# Patient Record
Sex: FEMALE | Race: BLACK OR AFRICAN AMERICAN | Employment: FULL TIME | URBAN - METROPOLITAN AREA
[De-identification: names, ages, dates, MRNs, and addresses within clinical notes are randomized per-mention and may not be internally consistent; named-entity substitution may affect disease eponyms.]

---

## 2017-04-19 ENCOUNTER — OFFICE VISIT (OUTPATIENT)
Dept: INTERNAL MEDICINE CLINIC | Age: 22
End: 2017-04-19

## 2017-04-19 VITALS
TEMPERATURE: 98.3 F | WEIGHT: 235.2 LBS | DIASTOLIC BLOOD PRESSURE: 70 MMHG | SYSTOLIC BLOOD PRESSURE: 119 MMHG | HEIGHT: 66 IN | BODY MASS INDEX: 37.8 KG/M2 | OXYGEN SATURATION: 98 % | RESPIRATION RATE: 16 BRPM | HEART RATE: 82 BPM

## 2017-04-19 DIAGNOSIS — H65.192 OTHER ACUTE NONSUPPURATIVE OTITIS MEDIA OF LEFT EAR, RECURRENCE NOT SPECIFIED: Primary | ICD-10-CM

## 2017-04-19 RX ORDER — AMOXICILLIN 875 MG/1
875 TABLET, FILM COATED ORAL 2 TIMES DAILY
Qty: 20 TAB | Refills: 0 | Status: SHIPPED | OUTPATIENT
Start: 2017-04-19 | End: 2017-04-29

## 2017-04-19 NOTE — PROGRESS NOTES
Subjective:     Chief Complaint   Patient presents with    Ear Pain     x 1 month left ear    Headache        She  is a 19y.o. year old female who presents with a complain of dull left ear pain for the past one month associated with left sided headache. Reports that she has been using OTC some drops to relive the symptoms but no relief. Pertinent items are noted in HPI. Objective:     Vitals:    17 1553   BP: 119/70   Pulse: 82   Resp: 16   Temp: 98.3 °F (36.8 °C)   TempSrc: Oral   SpO2: 98%   Weight: 235 lb 3.2 oz (106.7 kg)   Height: 5' 6\" (1.676 m)       Physical Examination: General appearance - alert, well appearing, and in no distress, oriented to person, place, and time and overweight  Mental status - alert, oriented to person, place, and time, normal mood, behavior, speech, dress, motor activity, and thought processes  Ears - right TM red but not bulging. left TM red, dull, bulging  Mouth - mucous membranes moist, pharynx normal without lesions. TTP over mastoid process.    Neck - supple, no significant adenopathy    No Known Allergies   Social History     Social History    Marital status: SINGLE     Spouse name: N/A    Number of children: N/A    Years of education: N/A     Social History Main Topics    Smoking status: Current Every Day Smoker     Packs/day: 0.25    Smokeless tobacco: Never Used    Alcohol use No      Comment: rarely    Drug use: No    Sexual activity: Yes     Partners: Male     Birth control/ protection: IUD     Other Topics Concern    None     Social History Narrative      Family History   Problem Relation Age of Onset    Stroke Maternal Grandfather     Hypertension Maternal Grandfather     Heart Attack Maternal Uncle       Past Surgical History:   Procedure Laterality Date    HX HEENT      [place] removed in     HX OTHER SURGICAL  D&C      x3      Past Medical History:   Diagnosis Date    Chlamydia     hx of a few years ago and treated    Headache  Heart abnormalities murmur    Heart murmur     Musculoskeletal disorder     Stool color black     Vaginal delivery       Current Outpatient Prescriptions   Medication Sig Dispense Refill    amoxicillin (AMOXIL) 875 mg tablet Take 1 Tab by mouth two (2) times a day for 10 days. 20 Tab 0    levonorgestrel (MIRENA) 20 mcg/24 hr (5 years) IUD 1 Each by IntraUTERine route once.  ibuprofen (MOTRIN) 800 mg tablet Take 1 Tab by mouth every eight (8) hours as needed for Pain. Indications: PAIN 30 Tab 0    calcium carbonate (TUMS) 200 mg calcium (500 mg) chew Take 1 Tab by mouth daily. Assessment/ Plan:   Aminata Hardy was seen today for ear pain and headache. Diagnoses and all orders for this visit:    Other acute nonsuppurative otitis media of left ear, recurrence not specified  -     Start amoxicillin (AMOXIL) 875 mg tablet; Take 1 Tab by mouth two (2) times a day for 10 days. -     Take Advil/aleve for the pain. Medication risks/benefits/costs/interactions/alternatives discussed with patient. Advised patient to call back or return to office if symptoms worsen/change/persist. If patient cannot reach us or should anything more severe/urgent arise he/she should proceed directly to the nearest emergency department. Discussed expected course/resolution/complications of diagnosis in detail with patient. Patient given a written after visit summary which includes her diagnoses, current medications and vitals. Patient expressed understanding with the diagnosis and plan. Follow-up Disposition:  Return if symptoms worsen or fail to improve.

## 2017-04-19 NOTE — MR AVS SNAPSHOT
Visit Information Date & Time Provider Department Dept. Phone Encounter #  
 4/19/2017  4:00 PM Philip Aldana MD [facility] [phone] [id number] Follow-up Instructions Return if symptoms worsen or fail to improve. Upcoming Health Maintenance Date Due  
 HPV AGE 9Y-34Y (1 of 3 - Female 3 Dose Series) 9/14/2004 Pneumococcal 19-64 Medium Risk (1 of 1 - PPSV23) 9/14/2012 DTaP/Tdap/Td series (1 - Tdap) 9/14/2014 PAP AKA CERVICAL CYTOLOGY 9/14/2014 INFLUENZA AGE 9 TO ADULT 8/1/2016 Allergies as of 4/19/2017  Review Complete On: 4/19/2017 By: Grant Palomo MD  
 No Known Allergies Current Immunizations  Never Reviewed No immunizations on file. Not reviewed this visit You Were Diagnosed With   
  
 Codes Comments Other acute nonsuppurative otitis media of left ear, recurrence not specified    -  Primary ICD-10-CM: E22.493 ICD-9-CM: 381.00 Vitals BP Pulse Temp Resp Height(growth percentile) Weight(growth percentile) 119/70 (BP 1 Location: Left arm, BP Patient Position: Sitting) 82 98.3 °F (36.8 °C) (Oral) 16 5' 6\" (1.676 m) 235 lb 3.2 oz (106.7 kg) LMP SpO2 BMI OB Status Smoking Status 03/16/2017 98% 37.96 kg/m2 Having regular periods Current Every Day Smoker BMI and BSA Data Body Mass Index Body Surface Area  
 37.96 kg/m 2 2.23 m 2 Preferred Pharmacy Pharmacy Name Phone [address] [phone] Your Updated Medication List  
  
   
This list is accurate as of: 4/19/17  4:14 PM.  Always use your most recent med list.  
  
  
  
  
 amoxicillin 875 mg tablet Commonly known as:  AMOXIL Take 1 Tab by mouth two (2) times a day for 10 days. calcium carbonate 200 mg calcium (500 mg) Chew Commonly known as:  TUMS Take 1 Tab by mouth daily. ibuprofen 800 mg tablet Commonly known as:  MOTRIN Take 1 Tab by mouth every eight (8) hours as needed for Pain. Indications: PAIN  
  
 levonorgestrel 20 mcg/24 hr (5 years) IUD Commonly known as:  MIRENA  
1 Each by IntraUTERine route once. Prescriptions Sent to Pharmacy Refills  
 amoxicillin (AMOXIL) 875 mg tablet 0 Sig: Take 1 Tab by mouth two (2) times a day for 10 days. Class: Normal  
 Pharmacy: [facility] [address] [place] [address] Drive [address] Ph #: [phone] Route: Oral  
  
Follow-up Instructions Return if symptoms worsen or fail to improve. Introducing [facility]! Hero Kimmy introduces "[employer] patient portal. Now you can access parts of your medical record, email your doctor's office, and request medication refills online. 1. In your internet browser, go to [email or web address] 2. Click on the First Time User? Click Here link in the Sign In box. You will see the New Member Sign Up page. 3. Enter your "[employer] Access Code exactly as it appears below. You will not need to use this code after youve completed the sign-up process. If you do not sign up before the expiration date, you must request a new code. · "[employer] Access Code: [email or web address] Expires: 7/18/2017  4:14 PM 
 
4. Enter the last four digits of your Social Security Number (xxxx) and Date of Birth (mm/dd/yyyy) as indicated and click Submit. You will be taken to the next sign-up page. 5. Create a [employer] ID. This will be your "[employer] login ID and cannot be changed, so think of one that is secure and easy to remember. 6. Create a "[employer] password. You can change your password at any time. 7. Enter your Password Reset Question and Answer. This can be used at a later time if you forget your password. 8. Enter your e-mail address. You will receive e-mail notification when new information is available in [address] 9. Click Sign Up. You can now view and download portions of your medical record. 10. Click the Download Summary menu link to download a portable copy of your medical information. If you have questions, please visit the Frequently Asked Questions section of the [employer] website. Remember, [employer] is NOT to be used for urgent needs. For medical emergencies, dial 911. Now available from your iPhone and Android! Please provide this summary of care documentation to your next provider. Your primary care clinician is listed as Philip Serrano. If you have any questions after today's visit, please call [phone].

## 2017-09-01 ENCOUNTER — HOSPITAL ENCOUNTER (EMERGENCY)
Age: 22
Discharge: HOME | End: 2017-09-01
Attending: STUDENT IN AN ORGANIZED HEALTH CARE EDUCATION/TRAINING PROGRAM
Payer: SELF-PAY

## 2017-09-01 VITALS
HEIGHT: 65 IN | BODY MASS INDEX: 38.52 KG/M2 | SYSTOLIC BLOOD PRESSURE: 137 MMHG | DIASTOLIC BLOOD PRESSURE: 90 MMHG | TEMPERATURE: 98.7 F | HEART RATE: 80 BPM | OXYGEN SATURATION: 100 % | RESPIRATION RATE: 16 BRPM | WEIGHT: 231.2 LBS

## 2017-09-01 DIAGNOSIS — T85.848A DENTAL IMPLANT PAIN, INITIAL ENCOUNTER: Primary | ICD-10-CM

## 2017-09-01 PROCEDURE — 99283 EMERGENCY DEPT VISIT LOW MDM: CPT

## 2017-09-01 PROCEDURE — 74011250637 HC RX REV CODE- 250/637: Performed by: STUDENT IN AN ORGANIZED HEALTH CARE EDUCATION/TRAINING PROGRAM

## 2017-09-01 PROCEDURE — 74011000250 HC RX REV CODE- 250: Performed by: STUDENT IN AN ORGANIZED HEALTH CARE EDUCATION/TRAINING PROGRAM

## 2017-09-01 RX ORDER — OXYCODONE AND ACETAMINOPHEN 5; 325 MG/1; MG/1
1 TABLET ORAL
Qty: 10 TAB | Refills: 0 | Status: SHIPPED | OUTPATIENT
Start: 2017-09-01 | End: 2017-09-04

## 2017-09-01 RX ADMIN — LIDOCAINE HYDROCHLORIDE: 20 SOLUTION ORAL; TOPICAL at 06:50

## 2017-09-01 NOTE — DISCHARGE INSTRUCTIONS
We hope that we have addressed all of your medical concerns. The examination and treatment you received in the Emergency Department were for an emergent problem and were not intended as complete care. It is important that you follow up with your healthcare provider(s) for ongoing care. If your symptoms worsen or do not improve as expected, and you are unable to reach your usual health care provider(s), you should return to the Emergency Department. Today's healthcare is undergoing tremendous change, and patient satisfaction surveys are one of the many tools to assess the quality of medical care. You may receive a survey from the [employer]. regarding your experience in the Emergency Department. I hope that your experience has been completely positive, particularly the medical care that I provided. As such, please participate in the survey; anything less than excellent does not meet my expectations or intentions. [facility] and [address] [place] [address] participate in nationally recognized quality of care measures. If your blood pressure is greater than 120/80, as reported below, we urge that you seek medical care to address the potential of high blood pressure, commonly known as hypertension. Hypertension can be hereditary or can be caused by certain medical conditions, pain, stress, or \"white coat syndrome. \"       Please make an appointment with your health care provider(s) for follow up of your Emergency Department visit. VITALS:   Patient Vitals for the past 8 hrs:   Temp Pulse Resp BP SpO2   09/01/17 0631 98.7 °F (37.1 °C) 80 16 137/90 100 %          Thank you for allowing us to provide you with medical care today. We realize that you have many choices for your emergency care needs. Please choose us in the future for any continued health care needs. Heyward Goltz Robbert Laurence, [facility]. Office: [phone]            No results found for this or any previous visit (from the past 24 hour(s)). No results found. Dental Pain: After Your Visit  Your Care Instructions  The most common cause of dental pain is tooth decay. It can also be caused by an infection of the tooth (abscess) or gum, a tooth that has not broken all the way through the gum (impacted tooth), or a problem with the nerve-filled center of the tooth. Follow-up care is a key part of your treatment and safety. Be sure to make and go to all appointments, and call your doctor if you are having problems. Its also a good idea to know your test results and keep a list of the medicines you take. How can you care for yourself at home? · Contact a dentist for follow-up care. · Put ice or a cold pack on the outside of your mouth for 10 to 20 minutes at a time to reduce pain and swelling. Put a thin cloth between the ice and your skin. · Take an over-the-counter pain medicine, such as acetaminophen (Tylenol), ibuprofen (Advil, Motrin), or naproxen (Aleve). Read and follow all instructions on the label. · Do not take two or more pain medicines at the same time unless the doctor told you to. Many pain medicines have acetaminophen, which is Tylenol. Too much acetaminophen (Tylenol) can be harmful. · Rinse your mouth with warm salt water every 2 hours to help relieve pain and swelling from an infected tooth. Mix 1 teaspoon of salt in 8 ounces of water. · If your doctor prescribed antibiotics, take them as directed. Do not stop taking them just because you feel better. You need to take the full course of antibiotics. When should you call for help? Call your doctor now or seek immediate medical care if:  · You have signs of infection, such as:  ¨ Increased pain, swelling, warmth, or redness. ¨ Pus draining from the gum, tooth, or face. ¨ A fever.   Watch closely for changes in your health, and be sure to contact your doctor if:  · You do not get better as expected. Where can you learn more? Go to WebKite.be  Enter V264 in the search box to learn more about \"Dental Pain: After Your Visit. \"   © 7937-2199 [employer]. Care instructions adapted under license by Cheryl Wagner (which disclaims liability or warranty for this information). This care instruction is for use with your licensed healthcare professional. If you have questions about a medical condition or this instruction, always ask your healthcare professional. [place] [address] any warranty or liability for your use of this information. Content Version: 83.0.066618;  Last Revised: May 17, 2013

## 2017-09-01 NOTE — ED PROVIDER NOTES
HPI Comments: 19 y.o. female with past medical history significant for heart abnormalities who presents from home with chief complaint of dental pain. The pt c/o pain of her 31st tooth. The pt reports that she saw her dentist 2 months ago for filling reconstruction which is now causing her discomfort. The pain is worsened with suction and PO intake. The pt has been taking Tylenol, Ibuprofen, and BC powder without relief. The drive to the ED. The pt denies having allergies. There are no other acute medical concerns at this time. Social hx: smoker, no EtOH use, no drug use  PCP: Fabienne Alcantar MD    Note written by Janak Lopez, as dictated by Prudencio Conteh MD 6:45 AM      The history is provided by the patient. No  was used. Past Medical History:   Diagnosis Date    Chlamydia     hx of a few years ago and treated    Headache     Heart abnormalities murmur    Heart murmur     Musculoskeletal disorder     Stool color black     Vaginal delivery        Past Surgical History:   Procedure Laterality Date    HX HEENT      [place] removed in     HX OTHER SURGICAL  D&C      x3         Family History:   Problem Relation Age of Onset    Stroke Maternal Grandfather     Hypertension Maternal Grandfather     Heart Attack Maternal Uncle        Social History     Social History    Marital status: SINGLE     Spouse name: N/A    Number of children: N/A    Years of education: N/A     Occupational History    Not on file. Social History Main Topics    Smoking status: Current Every Day Smoker     Packs/day: 0.25    Smokeless tobacco: Never Used    Alcohol use No      Comment: rarely    Drug use: No    Sexual activity: Yes     Partners: Male     Birth control/ protection: IUD     Other Topics Concern    Not on file     Social History Narrative         ALLERGIES: Review of patient's allergies indicates no known allergies.     Review of Systems Constitutional: Negative for activity change, diaphoresis, fatigue and fever. HENT: Positive for dental problem. Negative for congestion and sore throat. Eyes: Negative for photophobia and visual disturbance. Respiratory: Negative for chest tightness and shortness of breath. Cardiovascular: Negative for chest pain, palpitations and leg swelling. Gastrointestinal: Negative for abdominal pain, blood in stool, constipation, diarrhea, nausea and vomiting. Genitourinary: Negative for difficulty urinating, dysuria, flank pain, frequency and hematuria. Musculoskeletal: Negative for back pain. Neurological: Negative for dizziness, syncope, numbness and headaches. All other systems reviewed and are negative. Vitals:    09/01/17 0631   BP: 137/90   Pulse: 80   Resp: 16   Temp: 98.7 °F (37.1 °C)   SpO2: 100%   Weight: 104.9 kg (231 lb 3.2 oz)   Height: 5' 5\" (1.651 m)            Physical Exam   Constitutional: She is oriented to person, place, and time. She appears well-developed and well-nourished. No distress. HENT:   Head: Normocephalic and atraumatic. Nose: Nose normal.   Mouth/Throat: Oropharynx is clear and moist. No oropharyngeal exudate. Eyes: Conjunctivae and EOM are normal. Right eye exhibits no discharge. Left eye exhibits no discharge. No scleral icterus. Neck: Normal range of motion. Neck supple. No JVD present. No tracheal deviation present. No thyromegaly present. Cardiovascular: Normal rate, regular rhythm, normal heart sounds and intact distal pulses. Exam reveals no gallop and no friction rub. No murmur heard. Pulmonary/Chest: Effort normal and breath sounds normal. No stridor. No respiratory distress. She has no wheezes. She has no rales. She exhibits no tenderness. Abdominal: Bowel sounds are normal. She exhibits no distension and no mass. There is no tenderness. There is no rebound. Musculoskeletal: Normal range of motion. She exhibits no edema or tenderness. Lymphadenopathy:     She has no cervical adenopathy. Neurological: She is alert and oriented to person, place, and time. No cranial nerve deficit. Coordination normal.   Skin: Skin is warm and dry. No rash noted. She is not diaphoretic. No erythema. No pallor. Psychiatric: She has a normal mood and affect. Her behavior is normal. Judgment and thought content normal.   Nursing note and vitals reviewed. Note written by Janak Velazquez, as dictated by Corrina Jay MD 6:45 AM      MDM  Number of Diagnoses or Management Options  Dental implant pain, initial encounter:   Diagnosis management comments:   Dental pain, dental caries, dental filling pain. This is 17-year-old female presenting with isolated tooth pain physical exam reassuring no evidence of abscess or infection likely pain secondary to feeling recently a dentist.    Plan: We'll provide local anesthesia to 2 advised patient to follow up with dentist for further management patient understands and agrees with plan. Risk of Complications, Morbidity, and/or Mortality  Presenting problems: low  Diagnostic procedures: low  Management options: low    Patient Progress  Patient progress: stable    ED Course       Procedures    7:28 AM  The patient has been reevaluated. The patient is ready for discharge. The patient's signs, symptoms, diagnosis, and discharge instructions have been discussed and the patient/ family has conveyed their understanding. The patient is to follow up as recommended or return to the ED should their symptoms worsen. Plan has been discussed and the patient is in agreement. LABORATORY TESTS:  No results found for this or any previous visit (from the past 12 hour(s)). IMAGING RESULTS:  No orders to display     No results found. MEDICATIONS GIVEN:  Medications   dental ball (lidocaine/benadryl/benzocaine) mixture ( Other Given 9/1/17 1213)       IMPRESSION:  1.  Dental implant pain, initial encounter PLAN:  1. Discharge Medication List as of 9/1/2017  7:02 AM      CONTINUE these medications which have NOT CHANGED    Details   levonorgestrel (MIRENA) 20 mcg/24 hr (5 years) IUD 1 Each by IntraUTERine route once., Historical Med      ibuprofen (MOTRIN) 800 mg tablet Take 1 Tab by mouth every eight (8) hours as needed for Pain. Indications: PAIN, Print, Disp-30 Tab, R-0      calcium carbonate (TUMS) 200 mg calcium (500 mg) chew Take 1 Tab by mouth daily. , Historical Med           2.    Follow-up Information     Follow up With Details Comments Josh Moffett MD  If symptoms worsen [address] [address] [phone]      [facility] EMERGENCY DEP  If symptoms worsen [address] [facility]  [phone]            Return to ED for new or worsening symptoms       Sumit Cain MD

## 2017-09-01 NOTE — ED TRIAGE NOTES
Patient arrives to ED with c/o pain to right bottom tooth, x 1 week, no swelling noted, patient had an appointment to see her dentist next tue, arrives to this ED with increased pain

## 2018-03-22 ENCOUNTER — OFFICE VISIT (OUTPATIENT)
Dept: INTERNAL MEDICINE CLINIC | Age: 23
End: 2018-03-22

## 2018-03-22 VITALS
DIASTOLIC BLOOD PRESSURE: 60 MMHG | HEART RATE: 66 BPM | HEIGHT: 65 IN | BODY MASS INDEX: 41.95 KG/M2 | RESPIRATION RATE: 18 BRPM | SYSTOLIC BLOOD PRESSURE: 102 MMHG | TEMPERATURE: 98.3 F | WEIGHT: 251.8 LBS | OXYGEN SATURATION: 99 %

## 2018-03-22 DIAGNOSIS — E66.01 OBESITY, MORBID (HCC): ICD-10-CM

## 2018-03-22 DIAGNOSIS — Z00.00 WELL ADULT ON ROUTINE HEALTH CHECK: Primary | ICD-10-CM

## 2018-03-22 RX ORDER — MEDROXYPROGESTERONE ACETATE 10 MG/1
TABLET ORAL
Refills: 0 | COMMUNITY
Start: 2018-01-30 | End: 2018-06-22 | Stop reason: ALTCHOICE

## 2018-03-22 NOTE — LETTER
3/27/2018 11:37 AM 
 
Ms. Yoel Matute [address] [id number] Dear Yoel Castellano: 
 
Please find your most recent results below. Resulted Orders CBC WITH AUTOMATED DIFF Result Value Ref Range WBC 6.4 3.4 - 10.8 x10E3/uL  
 RBC 4.89 3.77 - 5.28 x10E6/uL HGB 13.0 11.1 - 15.9 g/dL HCT 40.8 34.0 - 46.6 % MCV 83 79 - 97 fL  
 MCH 26.6 26.6 - 33.0 pg  
 MCHC 31.9 31.5 - 35.7 g/dL  
 RDW 14.1 12.3 - 15.4 % PLATELET 390 038 - 091 x10E3/uL NEUTROPHILS 55 Not Estab. % Lymphocytes 33 Not Estab. % MONOCYTES 9 Not Estab. % EOSINOPHILS 3 Not Estab. % BASOPHILS 0 Not Estab. %  
 ABS. NEUTROPHILS 3.5 1.4 - 7.0 x10E3/uL Abs Lymphocytes 2.1 0.7 - 3.1 x10E3/uL  
 ABS. MONOCYTES 0.6 0.1 - 0.9 x10E3/uL  
 ABS. EOSINOPHILS 0.2 0.0 - 0.4 x10E3/uL  
 ABS. BASOPHILS 0.0 0.0 - 0.2 x10E3/uL IMMATURE GRANULOCYTES 0 Not Estab. %  
 ABS. IMM. GRANS. 0.0 0.0 - 0.1 x10E3/uL Narrative Performed at:  [address]  [id number] : Al Vazquez MD, Phone:  [phone] METABOLIC PANEL, COMPREHENSIVE Result Value Ref Range Glucose 105 (H) 65 - 99 mg/dL BUN 10 6 - 20 mg/dL Creatinine 0.66 0.57 - 1.00 mg/dL GFR est non- >59 mL/min/1.73 GFR est  >59 mL/min/1.73  
 BUN/Creatinine ratio 15 9 - 23 Sodium 138 134 - 144 mmol/L Potassium 4.7 3.5 - 5.2 mmol/L Chloride 100 96 - 106 mmol/L  
 CO2 24 18 - 29 mmol/L Calcium 9.6 8.7 - 10.2 mg/dL Protein, total 7.2 6.0 - 8.5 g/dL Albumin 4.4 3.5 - 5.5 g/dL GLOBULIN, TOTAL 2.8 1.5 - 4.5 g/dL A-G Ratio 1.6 1.2 - 2.2 Bilirubin, total 0.3 0.0 - 1.2 mg/dL Alk. phosphatase 63 39 - 117 IU/L  
 AST (SGOT) 19 0 - 40 IU/L  
 ALT (SGPT) 22 0 - 32 IU/L Narrative Performed at:  [address]  [id number] : Al Vazquez MD, Phone:  [phone] TSH AND FREE T4  
 Result Value Ref Range TSH 1.470 0.450 - 4.500 uIU/mL T4, Free 1.25 0.82 - 1.77 ng/dL Narrative Performed at:  [address]  [id number] : Fadi Burgos MD, Phone:  [phone] LIPID PANEL Result Value Ref Range Cholesterol, total 203 (H) 100 - 199 mg/dL Triglyceride 149 0 - 149 mg/dL HDL Cholesterol 72 >39 mg/dL VLDL, calculated 30 5 - 40 mg/dL LDL, calculated 101 (H) 0 - 99 mg/dL Narrative Performed at:  [address]  [id number] : Fadi Burgos MD, Phone:  [phone] HEMOGLOBIN A1C WITH EAG Result Value Ref Range Hemoglobin A1c 5.5 4.8 - 5.6 % Comment:  
            Pre-diabetes: 5.7 - 6.4 Diabetes: >6.4 Glycemic control for adults with diabetes: <7.0 Estimated average glucose 111 mg/dL Narrative Performed at:  [address]  [id number] : Fadi Burgos MD, Phone:  [phone] CVD REPORT Result Value Ref Range INTERPRETATION Note Comment:  
   Supplemental report is available. Narrative Performed at:  [address] [place] [address]  [id number] : Beatrice Hernandez PhD, Phone:  [phone] RECOMMENDATIONS: 
 
1. CBC, kidney, liver, thyroid level is within normal range. 2.  Cholesterol level is very mildly elevated. No need to start any medication. Continue watching your diet, eat healthy, less moreno and fatty food, more baked or grilled or broiled food. Exercise 150 minutes/week will be helpful as well. Will recheck level in one year. Please call me if you have any questions: [phone] Sincerely, 
 
 
Yair Hernandez MD

## 2018-03-22 NOTE — LETTER
NOTIFICATION RETURN TO WORK / SCHOOL 
 
3/22/2018 3:45 PM 
 
Ms. Erendira Mckeon [facility] [address] [address] To Whom It May Concern: 
 
Erendira Mckeon is currently under the care of [facility]. She will return to work/school on: 3/23/18 If there are questions or concerns please have the patient contact our office.  
 
 
 
Sincerely, 
 
 
Leelee Taylor MD

## 2018-03-22 NOTE — PATIENT INSTRUCTIONS
Well Visit, Ages 25 to 48: Care Instructions  Your Care Instructions    Physical exams can help you stay healthy. Your doctor has checked your overall health and may have suggested ways to take good care of yourself. He or she also may have recommended tests. At home, you can help prevent illness with healthy eating, regular exercise, and other steps. Follow-up care is a key part of your treatment and safety. Be sure to make and go to all appointments, and call your doctor if you are having problems. It's also a good idea to know your test results and keep a list of the medicines you take. How can you care for yourself at home? · Reach and stay at a healthy weight. This will lower your risk for many problems, such as obesity, diabetes, heart disease, and high blood pressure. · Get at least 30 minutes of physical activity on most days of the week. Walking is a good choice. You also may want to do other activities, such as running, swimming, cycling, or playing tennis or team sports. Discuss any changes in your exercise program with your doctor. · Do not smoke or allow others to smoke around you. If you need help quitting, talk to your doctor about stop-smoking programs and medicines. These can increase your chances of quitting for good. · Talk to your doctor about whether you have any risk factors for sexually transmitted infections (STIs). Having one sex partner (who does not have STIs and does not have sex with anyone else) is a good way to avoid these infections. · Use birth control if you do not want to have children at this time. Talk with your doctor about the choices available and what might be best for you. · Protect your skin from too much sun. When you're outdoors from 10 a.m. to 4 p.m., stay in the shade or cover up with clothing and a hat with a wide brim. Wear sunglasses that block UV rays. Even when it's cloudy, put broad-spectrum sunscreen (SPF 30 or higher) on any exposed skin.   · See a dentist one or two times a year for checkups and to have your teeth cleaned. · Wear a seat belt in the car. · Drink alcohol in moderation, if at all. That means no more than 2 drinks a day for men and 1 drink a day for women. Follow your doctor's advice about when to have certain tests. These tests can spot problems early. For everyone  · Cholesterol. Have the fat (cholesterol) in your blood tested after age 21. Your doctor will tell you how often to have this done based on your age, family history, or other things that can increase your risk for heart disease. · Blood pressure. Have your blood pressure checked during a routine doctor visit. Your doctor will tell you how often to check your blood pressure based on your age, your blood pressure results, and other factors. · Vision. Talk with your doctor about how often to have a glaucoma test.  · Diabetes. Ask your doctor whether you should have tests for diabetes. · Colon cancer. Have a test for colon cancer at age 48. You may have one of several tests. If you are younger than 48, you may need a test earlier if you have any risk factors. Risk factors include whether you already had a precancerous polyp removed from your colon or whether your parent, brother, sister, or child has had colon cancer. For women  · Breast exam and mammogram. Talk to your doctor about when you should have a clinical breast exam and a mammogram. Medical experts differ on whether and how often women under 50 should have these tests. Your doctor can help you decide what is right for you. · Pap test and pelvic exam. Begin Pap tests at age 24. A Pap test is the best way to find cervical cancer. The test often is part of a pelvic exam. Ask how often to have this test.  · Tests for sexually transmitted infections (STIs). Ask whether you should have tests for STIs. You may be at risk if you have sex with more than one person, especially if your partners do not wear condoms.   For men  · Tests for sexually transmitted infections (STIs). Ask whether you should have tests for STIs. You may be at risk if you have sex with more than one person, especially if you do not wear a condom. · Testicular cancer exam. Ask your doctor whether you should check your testicles regularly. · Prostate exam. Talk to your doctor about whether you should have a blood test (called a PSA test) for prostate cancer. Experts differ on whether and when men should have this test. Some experts suggest it if you are older than 39 and are -American or have a father or brother who got prostate cancer when he was younger than 72. When should you call for help? Watch closely for changes in your health, and be sure to contact your doctor if you have any problems or symptoms that concern you. Where can you learn more? Go to http://kristian-sahil.info/. Enter P072 in the search box to learn more about \"Well Visit, Ages 25 to 48: Care Instructions. \"  Current as of: May 12, 2017  Content Version: 11.4  © 0092-5457 [employer]. Care instructions adapted under license by [employer] (which disclaims liability or warranty for this information). If you have questions about a medical condition or this instruction, always ask your healthcare professional. [address] any warranty or liability for your use of this information. Body Mass Index: Care Instructions  Your Care Instructions    Body mass index (BMI) can help you see if your weight is raising your risk for health problems. It uses a formula to compare how much you weigh with how tall you are. · A BMI lower than 18.5 is considered underweight. · A BMI between 18.5 and 24.9 is considered healthy. · A BMI between 25 and 29.9 is considered overweight. A BMI of 30 or higher is considered obese.   If your BMI is in the normal range, it means that you have a lower risk for weight-related health problems. If your BMI is in the overweight or obese range, you may be at increased risk for weight-related health problems, such as high blood pressure, heart disease, stroke, arthritis or joint pain, and diabetes. If your BMI is in the underweight range, you may be at increased risk for health problems such as fatigue, lower protection (immunity) against illness, muscle loss, bone loss, hair loss, and hormone problems. BMI is just one measure of your risk for weight-related health problems. You may be at higher risk for health problems if you are not active, you eat an unhealthy diet, or you drink too much alcohol or use tobacco products. Follow-up care is a key part of your treatment and safety. Be sure to make and go to all appointments, and call your doctor if you are having problems. It's also a good idea to know your test results and keep a list of the medicines you take. How can you care for yourself at home? · Practice healthy eating habits. This includes eating plenty of fruits, vegetables, whole grains, lean protein, and low-fat dairy. · If your doctor recommends it, get more exercise. Walking is a good choice. Bit by bit, increase the amount you walk every day. Try for at least 30 minutes on most days of the week. · Do not smoke. Smoking can increase your risk for health problems. If you need help quitting, talk to your doctor about stop-smoking programs and medicines. These can increase your chances of quitting for good. · Limit alcohol to 2 drinks a day for men and 1 drink a day for women. Too much alcohol can cause health problems. If you have a BMI higher than 25  · Your doctor may do other tests to check your risk for weight-related health problems. This may include measuring the distance around your waist. A waist measurement of more than 40 inches in men or 35 inches in women can increase the risk of weight-related health problems.   · Talk with your doctor about steps you can take to stay healthy or improve your health. You may need to make lifestyle changes to lose weight and stay healthy, such as changing your diet and getting regular exercise. If you have a BMI lower than 18.5  · Your doctor may do other tests to check your risk for health problems. · Talk with your doctor about steps you can take to stay healthy or improve your health. You may need to make lifestyle changes to gain or maintain weight and stay healthy, such as getting more healthy foods in your diet and doing exercises to build muscle. Where can you learn more? Go to http://kristian-sahil.info/. Enter S176 in the search box to learn more about \"Body Mass Index: Care Instructions. \"  Current as of: October 13, 2016  Content Version: 11.4  © 5569-0232 [employer]. Care instructions adapted under license by [employer] (which disclaims liability or warranty for this information). If you have questions about a medical condition or this instruction, always ask your healthcare professional. [address] any warranty or liability for your use of this information.

## 2018-03-22 NOTE — PROGRESS NOTES
Subjective:   23 y.o. female for Well Woman Check. Pap is up to date. She states that due to her work schedule and study she is not able to be active. Gained 20 pounds in last 6 months. Patient Active Problem List   Diagnosis Code    Umbilical hernia R48.0    Pelvic pressure in pregnancy, antepartum O26.899, R10.2    Active labor [id number]    Obesity, morbid ([facility] [address] E66.01     Current Outpatient Prescriptions   Medication Sig Dispense Refill    medroxyPROGESTERone (PROVERA) 10 mg tablet   0     No Known Allergies  Past Medical History:   Diagnosis Date    Chlamydia     hx of a few years ago and treated    Headache(784.0)     Heart abnormalities murmur    Heart murmur     Musculoskeletal disorder     Stool color black     Vaginal delivery      Family History   Problem Relation Age of Onset    Stroke Maternal Grandfather     Hypertension Maternal Grandfather     Heart Attack Maternal Uncle      Social History   Substance Use Topics    Smoking status: Current Every Day Smoker     Packs/day: 0.25    Smokeless tobacco: Never Used    Alcohol use No      Comment: rarely             ROS: Feeling generally well. No TIA's or unusual headaches, no dysphagia. No prolonged cough. No dyspnea or chest pain on exertion. No abdominal pain, change in bowel habits, black or bloody stools. No urinary tract symptoms. No new or unusual musculoskeletal symptoms. Specific concerns today: none    Objective: The patient appears well, alert, oriented x 3, in no distress. Visit Vitals    /60 (BP 1 Location: Left arm, BP Patient Position: Sitting)    Pulse 66    Temp 98.3 °F (36.8 °C) (Temporal)    Resp 18    Ht 5' 5\" (1.651 m)    Wt 251 lb 12.8 oz (114.2 kg)    LMP 03/15/2018    SpO2 99%    BMI 41.9 kg/m2     ENT normal.  Neck supple. No adenopathy or thyromegaly. EDILIA. Lungs are clear, good air entry, no wheezes, rhonchi or rales. S1 and S2 normal, no murmurs, regular rate and rhythm.  Abdomen soft without tenderness, guarding, mass or organomegaly. Extremities show no edema, normal peripheral pulses. Neurological is normal, no focal findings. Breast and Pelvic exams are deferred. Assessment/Plan:   Well Woman  lose weight, increase physical activity, follow low fat diet, follow low salt diet, routine labs ordered, call if any problems    ICD-10-CM ICD-9-CM    1. Well adult on routine health check Z00.00 V70.0 medroxyPROGESTERone (PROVERA) 10 mg tablet      CBC WITH AUTOMATED DIFF      METABOLIC PANEL, COMPREHENSIVE      TSH AND FREE T4      LIPID PANEL      HEMOGLOBIN A1C WITH EAG     Diagnoses and all orders for this visit:    1. Well adult on routine health check  -     CBC WITH AUTOMATED DIFF  -     METABOLIC PANEL, COMPREHENSIVE  -     TSH AND FREE T4  -     LIPID PANEL  -     HEMOGLOBIN A1C WITH EAG    2. Obesity, morbid ([address]       -  Counseled on regular diet and exercise. Goal is to loose 2 pounds/week         Will consider metformin to help loose weight depending on the lab results. Follow-up Disposition:  Return in about 3 months (around 6/22/2018) for weight.   reviewed diet, exercise and weight control

## 2018-03-22 NOTE — MR AVS SNAPSHOT
[facility] [address] 
[phone] Patient: Nazia Burnham MRN: [id number] Visit Information Date & Time Provider Department Dept. Phone Encounter #  
 3/22/2018  3:15 PM Marinell Ahumada, MD [facility] Internal Medicine [phone] [id number] Follow-up Instructions Return in about 3 months (around 6/22/2018) for weight. Upcoming Health Maintenance Date Due  
 HPV AGE 9Y-34Y (1 of 3 - Female 3 Dose Series) 9/14/2004 Pneumococcal 19-64 Medium Risk (1 of 1 - PPSV23) 9/14/2012 DTaP/Tdap/Td series (1 - Tdap) 9/14/2014 Influenza Age 5 to Adult 8/1/2017 PAP AKA CERVICAL CYTOLOGY 2/14/2021 Allergies as of 3/22/2018  Review Complete On: 3/22/2018 By: Marinell Ahumada, MD  
 No Known Allergies Current Immunizations  Never Reviewed Name Date Td 9/14/2004 Not reviewed this visit You Were Diagnosed With   
  
 Codes Comments Well adult on routine health check    -  Primary ICD-10-CM: Z00.00 ICD-9-CM: V70.0 Vitals BP Pulse Temp Resp Height(growth percentile) Weight(growth percentile) 102/60 (BP 1 Location: Left arm, BP Patient Position: Sitting) 66 98.3 °F (36.8 °C) (Temporal) 18 5' 5\" (1.651 m) 251 lb 12.8 oz (114.2 kg) LMP SpO2 BMI OB Status Smoking Status 03/15/2018 99% 41.9 kg/m2 Having regular periods Current Every Day Smoker Vitals History BMI and BSA Data Body Mass Index Body Surface Area 41.9 kg/m 2 2.29 m 2 Preferred Pharmacy Pharmacy Name Phone [address], [address] [facility] Laurent Mcconnell [address] [phone] Your Updated Medication List  
  
   
This list is accurate as of 3/22/18  3:43 PM.  Always use your most recent med list.  
  
  
  
  
 medroxyPROGESTERone 10 mg tablet Commonly known as:  PROVERA We Performed the Following CBC WITH AUTOMATED DIFF [63595 CPT(R)] HEMOGLOBIN A1C WITH EAG [38457 CPT(R)] LIPID PANEL [96697 CPT(R)] METABOLIC PANEL, COMPREHENSIVE [40129 CPT(R)] TSH AND FREE T4 [69943 CPT(R)] Follow-up Instructions Return in about 3 months (around 6/22/2018) for weight. Patient Instructions Well Visit, Ages 25 to 48: Care Instructions Your Care Instructions Physical exams can help you stay healthy. Your doctor has checked your overall health and may have suggested ways to take good care of yourself. He or she also may have recommended tests. At home, you can help prevent illness with healthy eating, regular exercise, and other steps. Follow-up care is a key part of your treatment and safety. Be sure to make and go to all appointments, and call your doctor if you are having problems. It's also a good idea to know your test results and keep a list of the medicines you take. How can you care for yourself at home? · Reach and stay at a healthy weight. This will lower your risk for many problems, such as obesity, diabetes, heart disease, and high blood pressure. · Get at least 30 minutes of physical activity on most days of the week. Walking is a good choice. You also may want to do other activities, such as running, swimming, cycling, or playing tennis or team sports. Discuss any changes in your exercise program with your doctor. · Do not smoke or allow others to smoke around you. If you need help quitting, talk to your doctor about stop-smoking programs and medicines. These can increase your chances of quitting for good. · Talk to your doctor about whether you have any risk factors for sexually transmitted infections (STIs). Having one sex partner (who does not have STIs and does not have sex with anyone else) is a good way to avoid these infections. · Use birth control if you do not want to have children at this time.  Talk with your doctor about the choices available and what might be best for you. · Protect your skin from too much sun. When you're outdoors from 10 a.m. to 4 p.m., stay in the shade or cover up with clothing and a hat with a wide brim. Wear sunglasses that block UV rays. Even when it's cloudy, put broad-spectrum sunscreen (SPF 30 or higher) on any exposed skin. · See a dentist one or two times a year for checkups and to have your teeth cleaned. · Wear a seat belt in the car. · Drink alcohol in moderation, if at all. That means no more than 2 drinks a day for men and 1 drink a day for women. Follow your doctor's advice about when to have certain tests. These tests can spot problems early. For everyone · Cholesterol. Have the fat (cholesterol) in your blood tested after age 21. Your doctor will tell you how often to have this done based on your age, family history, or other things that can increase your risk for heart disease. · Blood pressure. Have your blood pressure checked during a routine doctor visit. Your doctor will tell you how often to check your blood pressure based on your age, your blood pressure results, and other factors. · Vision. Talk with your doctor about how often to have a glaucoma test. 
· Diabetes. Ask your doctor whether you should have tests for diabetes. · Colon cancer. Have a test for colon cancer at age 48. You may have one of several tests. If you are younger than 48, you may need a test earlier if you have any risk factors. Risk factors include whether you already had a precancerous polyp removed from your colon or whether your parent, brother, sister, or child has had colon cancer. For women · Breast exam and mammogram. Talk to your doctor about when you should have a clinical breast exam and a mammogram. Medical experts differ on whether and how often women under 50 should have these tests. Your doctor can help you decide what is right for you. · Pap test and pelvic exam. Begin Pap tests at age 24. A Pap test is the best way to find cervical cancer. The test often is part of a pelvic exam. Ask how often to have this test. 
· Tests for sexually transmitted infections (STIs). Ask whether you should have tests for STIs. You may be at risk if you have sex with more than one person, especially if your partners do not wear condoms. For men · Tests for sexually transmitted infections (STIs). Ask whether you should have tests for STIs. You may be at risk if you have sex with more than one person, especially if you do not wear a condom. · Testicular cancer exam. Ask your doctor whether you should check your testicles regularly. · Prostate exam. Talk to your doctor about whether you should have a blood test (called a PSA test) for prostate cancer. Experts differ on whether and when men should have this test. Some experts suggest it if you are older than 39 and are -American or have a father or brother who got prostate cancer when he was younger than 72. When should you call for help? Watch closely for changes in your health, and be sure to contact your doctor if you have any problems or symptoms that concern you. Where can you learn more? Go to http://kristian-sahil.info/. Enter P072 in the search box to learn more about \"Well Visit, Ages 25 to 48: Care Instructions. \" Current as of: May 12, 2017 Content Version: 11.4 © 8744-6054 [employer]. Care instructions adapted under license by [employer] (which disclaims liability or warranty for this information). If you have questions about a medical condition or this instruction, always ask your healthcare professional. [place] [address] any warranty or liability for your use of this information. Body Mass Index: Care Instructions Your Care Instructions Body mass index (BMI) can help you see if your weight is raising your risk for health problems. It uses a formula to compare how much you weigh with how tall you are. · A BMI lower than 18.5 is considered underweight. · A BMI between 18.5 and 24.9 is considered healthy. · A BMI between 25 and 29.9 is considered overweight. A BMI of 30 or higher is considered obese. If your BMI is in the normal range, it means that you have a lower risk for weight-related health problems. If your BMI is in the overweight or obese range, you may be at increased risk for weight-related health problems, such as high blood pressure, heart disease, stroke, arthritis or joint pain, and diabetes. If your BMI is in the underweight range, you may be at increased risk for health problems such as fatigue, lower protection (immunity) against illness, muscle loss, bone loss, hair loss, and hormone problems. BMI is just one measure of your risk for weight-related health problems. You may be at higher risk for health problems if you are not active, you eat an unhealthy diet, or you drink too much alcohol or use tobacco products. Follow-up care is a key part of your treatment and safety. Be sure to make and go to all appointments, and call your doctor if you are having problems. It's also a good idea to know your test results and keep a list of the medicines you take. How can you care for yourself at home? · Practice healthy eating habits. This includes eating plenty of fruits, vegetables, whole grains, lean protein, and low-fat dairy. · If your doctor recommends it, get more exercise. Walking is a good choice. Bit by bit, increase the amount you walk every day. Try for at least 30 minutes on most days of the week. · Do not smoke. Smoking can increase your risk for health problems. If you need help quitting, talk to your doctor about stop-smoking programs and medicines. These can increase your chances of quitting for good. · Limit alcohol to 2 drinks a day for men and 1 drink a day for women.  Too much alcohol can cause health problems. If you have a BMI higher than 25 · Your doctor may do other tests to check your risk for weight-related health problems. This may include measuring the distance around your waist. A waist measurement of more than 40 inches in men or 35 inches in women can increase the risk of weight-related health problems. · Talk with your doctor about steps you can take to stay healthy or improve your health. You may need to make lifestyle changes to lose weight and stay healthy, such as changing your diet and getting regular exercise. If you have a BMI lower than 18.5 · Your doctor may do other tests to check your risk for health problems. · Talk with your doctor about steps you can take to stay healthy or improve your health. You may need to make lifestyle changes to gain or maintain weight and stay healthy, such as getting more healthy foods in your diet and doing exercises to build muscle. Where can you learn more? Go to http://kristian-sahil.info/. Enter S176 in the search box to learn more about \"Body Mass Index: Care Instructions. \" Current as of: October 13, 2016 Content Version: 11.4 © 2967-0543 [employer]. Care instructions adapted under license by [employer] (which disclaims liability or warranty for this information). If you have questions about a medical condition or this instruction, always ask your healthcare professional. [address] any warranty or liability for your use of this information. Introducing [facility]! Arianna Mcmahon introduces EyeIC patient portal. Now you can access parts of your medical record, email your doctor's office, and request medication refills online. 1. In your internet browser, go to [email or web address] 2. Click on the First Time User? Click Here link in the Sign In box. You will see the New Member Sign Up page. 3. Enter your Omnistream Access Code exactly as it appears below. You will not need to use this code after youve completed the sign-up process. If you do not sign up before the expiration date, you must request a new code. · Omnistream Access Code: [email or web address] Expires: 6/20/2018  3:43 PM 
 
4. Enter the last four digits of your Social Security Number (xxxx) and Date of Birth (mm/dd/yyyy) as indicated and click Submit. You will be taken to the next sign-up page. 5. Create a Omnistream ID. This will be your Omnistream login ID and cannot be changed, so think of one that is secure and easy to remember. 6. Create a Omnistream password. You can change your password at any time. 7. Enter your Password Reset Question and Answer. This can be used at a later time if you forget your password. 8. Enter your e-mail address. You will receive e-mail notification when new information is available in [address] 9. Click Sign Up. You can now view and download portions of your medical record. 10. Click the Download Summary menu link to download a portable copy of your medical information. If you have questions, please visit the Frequently Asked Questions section of the Omnistream website. Remember, Omnistream is NOT to be used for urgent needs. For medical emergencies, dial 911. Now available from your iPhone and Android! Please provide this summary of care documentation to your next provider. Your primary care clinician is listed as Philip Serrano. If you have any questions after today's visit, please call [phone].

## 2018-03-26 ENCOUNTER — LAB ONLY (OUTPATIENT)
Dept: INTERNAL MEDICINE CLINIC | Age: 23
End: 2018-03-26

## 2018-03-26 NOTE — PROGRESS NOTES
Patient here for labs only. Informed pt that she will be notified of results. Pt verbalized her understanding.

## 2018-03-27 LAB
ALBUMIN SERPL-MCNC: 4.4 G/DL (ref 3.5–5.5)
ALBUMIN/GLOB SERPL: 1.6 {RATIO} (ref 1.2–2.2)
ALP SERPL-CCNC: 63 IU/L (ref 39–117)
ALT SERPL-CCNC: 22 IU/L (ref 0–32)
AST SERPL-CCNC: 19 IU/L (ref 0–40)
BASOPHILS # BLD AUTO: 0 X10E3/UL (ref 0–0.2)
BASOPHILS NFR BLD AUTO: 0 %
BILIRUB SERPL-MCNC: 0.3 MG/DL (ref 0–1.2)
BUN SERPL-MCNC: 10 MG/DL (ref 6–20)
BUN/CREAT SERPL: 15 (ref 9–23)
CALCIUM SERPL-MCNC: 9.6 MG/DL (ref 8.7–10.2)
CHLORIDE SERPL-SCNC: 100 MMOL/L (ref 96–106)
CHOLEST SERPL-MCNC: 203 MG/DL (ref 100–199)
CO2 SERPL-SCNC: 24 MMOL/L (ref 18–29)
CREAT SERPL-MCNC: 0.66 MG/DL (ref 0.57–1)
EOSINOPHIL # BLD AUTO: 0.2 X10E3/UL (ref 0–0.4)
EOSINOPHIL NFR BLD AUTO: 3 %
ERYTHROCYTE [DISTWIDTH] IN BLOOD BY AUTOMATED COUNT: 14.1 % (ref 12.3–15.4)
EST. AVERAGE GLUCOSE BLD GHB EST-MCNC: 111 MG/DL
GFR SERPLBLD CREATININE-BSD FMLA CKD-EPI: 124 ML/MIN/1.73
GFR SERPLBLD CREATININE-BSD FMLA CKD-EPI: 143 ML/MIN/1.73
GLOBULIN SER CALC-MCNC: 2.8 G/DL (ref 1.5–4.5)
GLUCOSE SERPL-MCNC: 105 MG/DL (ref 65–99)
HBA1C MFR BLD: 5.5 % (ref 4.8–5.6)
HCT VFR BLD AUTO: 40.8 % (ref 34–46.6)
HDLC SERPL-MCNC: 72 MG/DL
HGB BLD-MCNC: 13 G/DL (ref 11.1–15.9)
IMM GRANULOCYTES # BLD: 0 X10E3/UL (ref 0–0.1)
IMM GRANULOCYTES NFR BLD: 0 %
INTERPRETATION, 910389: NORMAL
LDLC SERPL CALC-MCNC: 101 MG/DL (ref 0–99)
LYMPHOCYTES # BLD AUTO: 2.1 X10E3/UL (ref 0.7–3.1)
LYMPHOCYTES NFR BLD AUTO: 33 %
MCH RBC QN AUTO: 26.6 PG (ref 26.6–33)
MCHC RBC AUTO-ENTMCNC: 31.9 G/DL (ref 31.5–35.7)
MCV RBC AUTO: 83 FL (ref 79–97)
MONOCYTES # BLD AUTO: 0.6 X10E3/UL (ref 0.1–0.9)
MONOCYTES NFR BLD AUTO: 9 %
NEUTROPHILS # BLD AUTO: 3.5 X10E3/UL (ref 1.4–7)
NEUTROPHILS NFR BLD AUTO: 55 %
PLATELET # BLD AUTO: 275 X10E3/UL (ref 150–379)
POTASSIUM SERPL-SCNC: 4.7 MMOL/L (ref 3.5–5.2)
PROT SERPL-MCNC: 7.2 G/DL (ref 6–8.5)
RBC # BLD AUTO: 4.89 X10E6/UL (ref 3.77–5.28)
SODIUM SERPL-SCNC: 138 MMOL/L (ref 134–144)
T4 FREE SERPL-MCNC: 1.25 NG/DL (ref 0.82–1.77)
TRIGL SERPL-MCNC: 149 MG/DL (ref 0–149)
TSH SERPL DL<=0.005 MIU/L-ACNC: 1.47 UIU/ML (ref 0.45–4.5)
VLDLC SERPL CALC-MCNC: 30 MG/DL (ref 5–40)
WBC # BLD AUTO: 6.4 X10E3/UL (ref 3.4–10.8)

## 2018-03-27 NOTE — PROGRESS NOTES
Please mail letter:     1. CBC, kidney, liver, thyroid level is within normal range. 2.  Cholesterol level is very mildly elevated. No need to start any medication. Continue watching your diet, eat healthy, less moreno and fatty food, more baked or grilled or broiled food. Exercise 150 minutes/week will be helpful as well. Will recheck level in one year.

## 2018-06-22 ENCOUNTER — OFFICE VISIT (OUTPATIENT)
Dept: INTERNAL MEDICINE CLINIC | Age: 23
End: 2018-06-22

## 2018-06-22 VITALS
WEIGHT: 246.6 LBS | OXYGEN SATURATION: 100 % | TEMPERATURE: 97.8 F | HEIGHT: 65 IN | RESPIRATION RATE: 18 BRPM | HEART RATE: 68 BPM | SYSTOLIC BLOOD PRESSURE: 122 MMHG | BODY MASS INDEX: 41.09 KG/M2 | DIASTOLIC BLOOD PRESSURE: 68 MMHG

## 2018-06-22 DIAGNOSIS — Z71.3 WEIGHT LOSS COUNSELING, ENCOUNTER FOR: ICD-10-CM

## 2018-06-22 DIAGNOSIS — N91.2 AMENORRHEA: Primary | ICD-10-CM

## 2018-06-22 DIAGNOSIS — E66.01 OBESITY, MORBID (HCC): ICD-10-CM

## 2018-06-22 LAB
HCG URINE, QL. (POC): NEGATIVE
VALID INTERNAL CONTROL?: YES

## 2018-06-22 NOTE — MR AVS SNAPSHOT
N[facility] Cortes 
 
 
 [address] [place] [address] 
[phone] Patient: Ruperto Alanis MRN: [id number] Visit Information Date & Time Provider Department Dept. Phone Encounter #  
 6/22/2018  8:15 AM Philip Mcfarlane MD [facility] Internal Medicine [phone] [id number] Upcoming Health Maintenance Date Due  
 HPV Age 9Y-34Y (1 of 1 - Female 3 Dose Series) 9/14/2004 DTaP/Tdap/Td series (2 - Tdap) 9/14/2014 Influenza Age 5 to Adult 3/22/2019* Pneumococcal 19-64 Medium Risk (1 of 1 - PPSV23) 3/22/2027* PAP AKA CERVICAL CYTOLOGY 2/14/2021 *Topic was postponed. The date shown is not the original due date. Allergies as of 6/22/2018  Review Complete On: 6/22/2018 By: Oemga Moses MD  
 No Known Allergies Current Immunizations  Never Reviewed Name Date Td 9/14/2004 Not reviewed this visit You Were Diagnosed With   
  
 Codes Comments Amenorrhea    -  Primary ICD-10-CM: N91.2 ICD-9-CM: 626.0 Obesity, morbid ([address]     ICD-10-CM: E66.01 
ICD-9-CM: 278.01 Vitals BP Pulse Temp Resp Height(growth percentile) Weight(growth percentile) 122/68 (BP 1 Location: Left arm, BP Patient Position: Sitting) 68 97.8 °F (36.6 °C) (Temporal) 18 5' 5\" (1.651 m) 246 lb 9.6 oz (111.9 kg) LMP SpO2 BMI OB Status Smoking Status 02/22/2018 100% 41.04 kg/m2 Having regular periods Current Every Day Smoker Vitals History BMI and BSA Data Body Mass Index Body Surface Area 41.04 kg/m 2 2.27 m 2 Preferred Pharmacy Pharmacy Name Phone [address], [address] [facility] Laurent [address] [phone] Your Updated Medication List  
  
Notice  As of 6/22/2018  8:48 AM  
 You have not been prescribed any medications. We Performed the Following AMB POC URINE PREGNANCY TEST, VISUAL COLOR COMPARISON [66955 CPT(R)] REFERRAL TO ENDOCRINOLOGY [YJG03 Custom] Referral Information Referral ID Referred By Referred To  
  
 [id number] MADDIE STAFFORD MD   
   [address] [place] [address] [address] [place], [facility] [address] Phone: [phone] Fax: [phone] Visits Status Start Date End Date 1 New Request 6/22/18 6/22/19 If your referral has a status of pending review or denied, additional information will be sent to support the outcome of this decision. Introducing [facility]! Saima Arguelles introduces Amazon patient portal. Now you can access parts of your medical record, email your doctor's office, and request medication refills online. 1. In your internet browser, go to [email or web address] 2. Click on the First Time User? Click Here link in the Sign In box. You will see the New Member Sign Up page. 3. Enter your Amazon Access Code exactly as it appears below. You will not need to use this code after youve completed the sign-up process. If you do not sign up before the expiration date, you must request a new code. · Amazon Access Code: [email or web address] Expires: 9/20/2018  8:21 AM 
 
4. Enter the last four digits of your Social Security Number (xxxx) and Date of Birth (mm/dd/yyyy) as indicated and click Submit. You will be taken to the next sign-up page. 5. Create a Amazon ID. This will be your Amazon login ID and cannot be changed, so think of one that is secure and easy to remember. 6. Create a Amazon password. You can change your password at any time. 7. Enter your Password Reset Question and Answer. This can be used at a later time if you forget your password. 8. Enter your e-mail address. You will receive e-mail notification when new information is available in [address] 9. Click Sign Up. You can now view and download portions of your medical record.  
10. Click the Download Summary menu link to download a portable copy of your medical information. If you have questions, please visit the Frequently Asked Questions section of the Plex website. Remember, Plex is NOT to be used for urgent needs. For medical emergencies, dial 911. Now available from your iPhone and Android! Please provide this summary of care documentation to your next provider. Your primary care clinician is listed as Philip Serrano. If you have any questions after today's visit, please call [phone].

## 2018-06-22 NOTE — PROGRESS NOTES
Subjective:     Chief Complaint   Patient presents with    Weight Management     3 month f/u        She  is a 23y.o. year old female who presents today for three months follow up on her weight. She reports that because of her school, work and being mom its very hard for her to do any exercise. States that she is careful about what she eats and she thinks she does not eat that much. Feels tired due to her work schedule, lack of sleep. She also mentioned that she did not have period for the past 2-3 months. This is her norm. Since the beginning her period is irregular. She was on IUD, OCP but nothing helped to regulate her period. Recently thyroid, A1c level was normal.     Pertinent items are noted in HPI.   Objective:     Vitals:    06/22/18 0821   BP: 122/68   Pulse: 68   Resp: 18   Temp: 97.8 °F (36.6 °C)   TempSrc: Temporal   SpO2: 100%   Weight: 246 lb 9.6 oz (111.9 kg)   Height: 5' 5\" (1.651 m)       Physical Examination: General appearance - alert, well appearing, and in no distress, oriented to person, place, and time and overweight  Mental status - alert, oriented to person, place, and time, normal mood, behavior, speech, dress, motor activity, and thought processes  Neck - supple, no significant adenopathy, thyroid exam: thyroid is normal in size without nodules or tenderness  Chest - clear to auscultation, no wheezes, rales or rhonchi, symmetric air entry  Heart - normal rate, regular rhythm, normal S1, S2, no murmurs, rubs, clicks or gallops    No Known Allergies   Social History     Social History    Marital status: SINGLE     Spouse name: N/A    Number of children: N/A    Years of education: N/A     Social History Main Topics    Smoking status: Current Every Day Smoker     Packs/day: 0.25    Smokeless tobacco: Never Used    Alcohol use No      Comment: rarely    Drug use: No    Sexual activity: Yes     Partners: Male     Birth control/ protection: IUD     Other Topics Concern    None     Social History Narrative      Family History   Problem Relation Age of Onset    Stroke Maternal Grandfather     Hypertension Maternal Grandfather     Heart Attack Maternal Uncle       Past Surgical History:   Procedure Laterality Date    HX HEENT      [place] removed in 2011    HX OTHER SURGICAL  D&C      x3      Past Medical History:   Diagnosis Date    Chlamydia     hx of a few years ago and treated    Headache(784.0)     Heart abnormalities murmur    Heart murmur     Musculoskeletal disorder     Stool color black     Vaginal delivery            Assessment/ Plan:   Diagnoses and all orders for this visit:    1. Amenorrhea  -     [address] Endocrinology ref [facility]  -     AMB POC URINE PREGNANCY TEST, VISUAL COLOR COMPARISON is negative. 2. Weight loss counseling, encounter for        - encouraged to start structured exercise, lo calorie diet, small portion diet. - discussed about weight loss medication management but will defer the Rx until seen by endocrinologist.   3. Obesity, morbid ([address]         Same as #1. Medication risks/benefits/costs/interactions/alternatives discussed with patient. Advised patient to call back or return to office if symptoms worsen/change/persist. If patient cannot reach us or should anything more severe/urgent arise he/she should proceed directly to the nearest emergency department. Discussed expected course/resolution/complications of diagnosis in detail with patient. Patient given a written after visit summary which includes her diagnoses, current medications and vitals. Patient expressed understanding with the diagnosis and plan.        Follow-up Disposition: Not on File

## 2018-09-21 LAB
ANTIBODY SCREEN, EXTERNAL: NEGATIVE
CHLAMYDIA, EXTERNAL: NEGATIVE
HBSAG, EXTERNAL: NEGATIVE
HIV, EXTERNAL: NEGATIVE
N. GONORRHEA, EXTERNAL: NEGATIVE
RUBELLA, EXTERNAL: NORMAL
T. PALLIDUM, EXTERNAL: NONREACTIVE

## 2019-03-07 LAB — GRBS, EXTERNAL: POSITIVE

## 2019-04-03 ENCOUNTER — HOSPITAL ENCOUNTER (INPATIENT)
Age: 24
LOS: 2 days | Discharge: HOME | DRG: 560 | End: 2019-04-05
Attending: OBSTETRICS & GYNECOLOGY | Admitting: OBSTETRICS & GYNECOLOGY
Payer: MEDICAID

## 2019-04-03 LAB
ERYTHROCYTE [DISTWIDTH] IN BLOOD BY AUTOMATED COUNT: 13.2 % (ref 11.5–14.5)
HCT VFR BLD AUTO: 34 % (ref 35–47)
HGB BLD-MCNC: 11.1 G/DL (ref 11.5–16)
MCH RBC QN AUTO: 26.4 PG (ref 26–34)
MCHC RBC AUTO-ENTMCNC: 32.6 G/DL (ref 30–36.5)
MCV RBC AUTO: 81 FL (ref 80–99)
NRBC # BLD: 0 K/UL (ref 0–0.01)
NRBC BLD-RTO: 0 PER 100 WBC
PLATELET # BLD AUTO: 186 K/UL (ref 150–400)
PMV BLD AUTO: 9.4 FL (ref 8.9–12.9)
RBC # BLD AUTO: 4.2 M/UL (ref 3.8–5.2)
WBC # BLD AUTO: 7.2 K/UL (ref 3.6–11)

## 2019-04-03 PROCEDURE — 75410000000 HC DELIVERY VAGINAL/SINGLE

## 2019-04-03 PROCEDURE — 74011250637 HC RX REV CODE- 250/637: Performed by: OBSTETRICS & GYNECOLOGY

## 2019-04-03 PROCEDURE — 36415 COLL VENOUS BLD VENIPUNCTURE: CPT

## 2019-04-03 PROCEDURE — 99282 EMERGENCY DEPT VISIT SF MDM: CPT

## 2019-04-03 PROCEDURE — 65410000002 HC RM PRIVATE OB

## 2019-04-03 PROCEDURE — 74011000258 HC RX REV CODE- 258: Performed by: OBSTETRICS & GYNECOLOGY

## 2019-04-03 PROCEDURE — 85027 COMPLETE CBC AUTOMATED: CPT

## 2019-04-03 PROCEDURE — 75410000002 HC LABOR FEE PER 1 HR

## 2019-04-03 PROCEDURE — 75410000003 HC RECOV DEL/VAG/CSECN EA 0.5 HR

## 2019-04-03 PROCEDURE — 74011250636 HC RX REV CODE- 250/636: Performed by: OBSTETRICS & GYNECOLOGY

## 2019-04-03 RX ORDER — AMMONIA 15 % (W/V)
1 AMPUL (EA) INHALATION AS NEEDED
Status: DISCONTINUED | OUTPATIENT
Start: 2019-04-03 | End: 2019-04-05 | Stop reason: HOSPADM

## 2019-04-03 RX ORDER — ONDANSETRON 4 MG/1
4 TABLET, ORALLY DISINTEGRATING ORAL
Status: DISCONTINUED | OUTPATIENT
Start: 2019-04-03 | End: 2019-04-05 | Stop reason: HOSPADM

## 2019-04-03 RX ORDER — SODIUM CHLORIDE, SODIUM LACTATE, POTASSIUM CHLORIDE, CALCIUM CHLORIDE 600; 310; 30; 20 MG/100ML; MG/100ML; MG/100ML; MG/100ML
125 INJECTION, SOLUTION INTRAVENOUS CONTINUOUS
Status: DISCONTINUED | OUTPATIENT
Start: 2019-04-03 | End: 2019-04-03

## 2019-04-03 RX ORDER — OXYTOCIN/0.9 % SODIUM CHLORIDE 30/500 ML
0-25 PLASTIC BAG, INJECTION (ML) INTRAVENOUS
Status: DISCONTINUED | OUTPATIENT
Start: 2019-04-03 | End: 2019-04-03

## 2019-04-03 RX ORDER — SODIUM CHLORIDE 0.9 % (FLUSH) 0.9 %
5-40 SYRINGE (ML) INJECTION EVERY 8 HOURS
Status: DISCONTINUED | OUTPATIENT
Start: 2019-04-03 | End: 2019-04-03

## 2019-04-03 RX ORDER — DIPHENHYDRAMINE HCL 25 MG
25 CAPSULE ORAL
Status: DISCONTINUED | OUTPATIENT
Start: 2019-04-03 | End: 2019-04-05 | Stop reason: HOSPADM

## 2019-04-03 RX ORDER — SIMETHICONE 80 MG
80 TABLET,CHEWABLE ORAL
Status: DISCONTINUED | OUTPATIENT
Start: 2019-04-03 | End: 2019-04-05 | Stop reason: HOSPADM

## 2019-04-03 RX ORDER — TERBUTALINE SULFATE 1 MG/ML
0.25 INJECTION SUBCUTANEOUS AS NEEDED
Status: DISCONTINUED | OUTPATIENT
Start: 2019-04-03 | End: 2019-04-03

## 2019-04-03 RX ORDER — FENTANYL CITRATE 50 UG/ML
50 INJECTION, SOLUTION INTRAMUSCULAR; INTRAVENOUS
Status: DISCONTINUED | OUTPATIENT
Start: 2019-04-03 | End: 2019-04-03

## 2019-04-03 RX ORDER — SODIUM CHLORIDE 0.9 % (FLUSH) 0.9 %
5-40 SYRINGE (ML) INJECTION AS NEEDED
Status: DISCONTINUED | OUTPATIENT
Start: 2019-04-03 | End: 2019-04-05 | Stop reason: HOSPADM

## 2019-04-03 RX ORDER — SODIUM CHLORIDE 0.9 % (FLUSH) 0.9 %
5-40 SYRINGE (ML) INJECTION AS NEEDED
Status: DISCONTINUED | OUTPATIENT
Start: 2019-04-03 | End: 2019-04-03

## 2019-04-03 RX ORDER — DOCUSATE SODIUM 100 MG/1
100 CAPSULE, LIQUID FILLED ORAL
Status: DISCONTINUED | OUTPATIENT
Start: 2019-04-03 | End: 2019-04-05 | Stop reason: HOSPADM

## 2019-04-03 RX ORDER — NALBUPHINE HYDROCHLORIDE 10 MG/ML
10 INJECTION, SOLUTION INTRAMUSCULAR; INTRAVENOUS; SUBCUTANEOUS
Status: DISCONTINUED | OUTPATIENT
Start: 2019-04-03 | End: 2019-04-03

## 2019-04-03 RX ORDER — IBUPROFEN 400 MG/1
800 TABLET ORAL EVERY 8 HOURS
Status: DISCONTINUED | OUTPATIENT
Start: 2019-04-03 | End: 2019-04-05 | Stop reason: HOSPADM

## 2019-04-03 RX ORDER — HYDROCORTISONE 1 %
CREAM (GRAM) TOPICAL AS NEEDED
Status: DISCONTINUED | OUTPATIENT
Start: 2019-04-03 | End: 2019-04-05 | Stop reason: HOSPADM

## 2019-04-03 RX ORDER — HYDROCORTISONE ACETATE PRAMOXINE HCL 2.5; 1 G/100G; G/100G
CREAM TOPICAL AS NEEDED
Status: DISCONTINUED | OUTPATIENT
Start: 2019-04-03 | End: 2019-04-05 | Stop reason: HOSPADM

## 2019-04-03 RX ORDER — SODIUM CHLORIDE 0.9 % (FLUSH) 0.9 %
SYRINGE (ML) INJECTION
Status: DISCONTINUED
Start: 2019-04-03 | End: 2019-04-03

## 2019-04-03 RX ORDER — OXYTOCIN/RINGER'S LACTATE 20/1000 ML
125 PLASTIC BAG, INJECTION (ML) INTRAVENOUS AS NEEDED
Status: DISCONTINUED | OUTPATIENT
Start: 2019-04-03 | End: 2019-04-05 | Stop reason: HOSPADM

## 2019-04-03 RX ORDER — OXYTOCIN/RINGER'S LACTATE 20/1000 ML
999 PLASTIC BAG, INJECTION (ML) INTRAVENOUS AS NEEDED
Status: DISCONTINUED | OUTPATIENT
Start: 2019-04-03 | End: 2019-04-05 | Stop reason: HOSPADM

## 2019-04-03 RX ORDER — ZOLPIDEM TARTRATE 5 MG/1
5 TABLET ORAL
Status: DISCONTINUED | OUTPATIENT
Start: 2019-04-03 | End: 2019-04-05 | Stop reason: HOSPADM

## 2019-04-03 RX ORDER — SODIUM CHLORIDE 0.9 % (FLUSH) 0.9 %
5-40 SYRINGE (ML) INJECTION EVERY 8 HOURS
Status: DISCONTINUED | OUTPATIENT
Start: 2019-04-03 | End: 2019-04-05 | Stop reason: HOSPADM

## 2019-04-03 RX ADMIN — OXYTOCIN-SODIUM CHLORIDE 0.9% IV SOLN 30 UNIT/500ML 333 MILLI-UNITS/MIN: 30-0.9/5 SOLUTION at 17:01

## 2019-04-03 RX ADMIN — IBUPROFEN 800 MG: 400 TABLET, FILM COATED ORAL at 19:08

## 2019-04-03 RX ADMIN — Medication 10 ML: at 18:01

## 2019-04-03 RX ADMIN — PENICILLIN G POTASSIUM 2.5 MILLION UNITS: 20000000 POWDER, FOR SOLUTION INTRAVENOUS at 13:06

## 2019-04-03 RX ADMIN — SODIUM CHLORIDE, SODIUM LACTATE, POTASSIUM CHLORIDE, AND CALCIUM CHLORIDE 500 ML: 600; 310; 30; 20 INJECTION, SOLUTION INTRAVENOUS at 09:09

## 2019-04-03 RX ADMIN — SODIUM CHLORIDE 5 MILLION UNITS: 900 INJECTION, SOLUTION INTRAVENOUS at 09:08

## 2019-04-03 NOTE — PROGRESS NOTES
0800 Received pt of Dr. Carolee Valencia ambulatory to labor room 2, pt stated she started kristine last night but increased this morning 1192 spoke with Dr. Joni Paige updated on pt status, going to admit pt and place orders in the computer 4264 Dr. Joni Paige in room talking with pt Viewing strip  
 
2928 report given to YARELY Gaitan RN

## 2019-04-03 NOTE — PROGRESS NOTES
Labor Progress Note Patient seen, fetal heart rate and contraction pattern evaluated, patient examined. Patient Vitals for the past 12 hrs: 
 Temp Pulse Resp BP  
04/03/19 1103 99.5 °F (37.5 °C) 95 16 121/59  
04/03/19 0806 98.2 °F (36.8 °C) (!) 103 18 124/73 Physical Exam: 
Cervical Exam:   
4-5/70/-3 with BBOW Vertex on exam 
 
Membranes:  Intact FHR: 130, moderate variability, present accels, early decels, one variable on last monitoring Assessment/Plan: 
21 yo [id number] @ 40w1d with labor. GBS positive. Continue PCN. Progressing on own, cervix 4-5cm with BBOW. Will continue expectant management. FHR cat 2 given variable but overall moderate variability suggestive against fetal acidemia. Will place back on FHR monitor now.  
Myesha Palmer MD  
4/3/2019 
12:30 PM

## 2019-04-03 NOTE — H&P
History & Physical 
 
Name: Mina Toledo MRN: [id number]  SSN: [id number] YOB: 1995  Age: 20 y.o. Sex: female Subjective:  
 
Estimated Date of Delivery: 19 OB History  Para Term  AB Living 5 2 2 0 2 1 SAB TAB Ectopic Molar Multiple Live Births  
0 2 0   0 1 # Outcome Date GA Lbr Raymundo/2nd Weight Sex Delivery Anes PTL Lv  
5 Current 4 Term 12/16/15 39w2d 09:05  00:04 3.325 kg F VAGINAL DELI NITROUS OXID, Local N OTTO  
3 TAB           
2 TAB           
1 Term Ms. Immanuel Mulligan is admitted with pregnancy at 40w1d for early labor. Prenatal course complicated by obesity BMI 38, GBS positive status and IUGR (resolved). Pt had growth US 3/18/19 that showed baby 9.3%. Repeat growth 19 was 17%. Please see prenatal records for details. Past Medical History:  
Diagnosis Date  Chlamydia   
 hx of a few years ago and treated  Headache(784.0)  Heart abnormalities murmur  Heart murmur  Musculoskeletal disorder  Stool color black  Vaginal delivery Past Surgical History:  
Procedure Laterality Date  HX HEENT [place] removed in   HX OTHER SURGICAL  D&C   
  x3 Social History Occupational History  Not on file Tobacco Use  Smoking status: Current Every Day Smoker Packs/day: 0.25  Smokeless tobacco: Never Used Substance and Sexual Activity  Alcohol use: No  
  Comment: rarely  Drug use: No  
 Sexual activity: Yes  
  Partners: Male Birth control/protection: None Family History Problem Relation Age of Onset  Stroke Maternal Grandfather  Hypertension Maternal Grandfather  Heart Attack Maternal Uncle No Known Allergies Prior to Admission medications Medication Sig Start Date End Date Taking? Authorizing Provider PNV#16-Iron Fum & PS-FA-OM-3 35-1-200 mg cap Take 1 Tab by mouth daily.    Yes Provider, Historical  
  
 
 Review of Systems: A comprehensive review of systems was negative except for that written in the HPI. Objective:  
 
Vitals: 
Vitals:  
 19 0809 Weight: 105.7 kg (233 lb) Height: 5' 5\" (1.651 m) Physical Exam: 
Patient without distress. Heart: Regular rate and rhythm Lung: normal respiratory effort Abdomen: soft, nontender Fundus: soft and non tender Cervical Exam: Cervical Exam 
Dilation (cm): 1.5 Eff: 70 % Station: -3 Position: Anterior Cervical Consistency: Moderate Vaginal exam done by? : leidy trejorn  
 
(Checked by nursing, not rechecked by myself) Lower Extremities:  - Edema No 
Membranes:  Intact Fetal Heart Rate: 
130 moderate variability, present accels, present decels (earlies with one variable) Wilkinson: ctx q5-7 minutes Prenatal Labs:  
Lab Results Component Value Date/Time ABO/Rh(D) A POS 2010 04:15 AM  
 Rubella, External immune 2015 Rubella, External immune 2015 GrBStrep, External negative 2015 HBsAg, External negative 2015 HBsAg, External neg 2015 HIV, External negative 2015 HIV, External neg 2015 RPR, External non reactive 2015 RPR, External nonreactive 2015 Gonorrhea, External negative 2015 Gonorrhea, External neg 2015 Chlamydia, External negative 2015 Chlamydia, External neg 2015 ABO,Rh A positive 2015 ABO,Rh A pos 2015 Assessment/Plan:  
 
19 yo C0W2019 @ 40w1d with early labor. Cervix changed from FT in clinic to 1-2cm and patient kristine uncomfortably. Plan:  
 
Admit for early labor. Reviewed possible need for augmentation. Pt in agreement if needed. Will recheck cervix in 3-4 hours. Group B Strep was positive. PCN ordered Consents for delivery and blood reviewed Anticipate  FHR currently cat 2 but with moderate variability and accels suggestive against fetal acidemia. Pt desires to Lamaze. Will do continuous monitoring for now but if FHR is persistently category 1 will consider intermittent monitoring. Signed By:  Anne-Marie Mayes MD   
 April 3, 2019

## 2019-04-03 NOTE — PROCEDURES
Delivery Note    Obstetrician:  Jose Jacobson MD    Assistant: none    Pre-Delivery Diagnosis:  [id number] @ 40w1d  Labor    Post-Delivery Diagnosis:   [id number] @ 40w1d s/p   Labor  Viable female infant    Intrapartum Event: None    Procedure: Spontaneous vaginal delivery    Epidural: NO    Monitor:  Fetal Heart Tones - External and Uterine Contractions - External    Indications for instrumental delivery: none    Estimated Blood Loss: 200cc    Episiotomy: n/a    Laceration(s):  none    Laceration(s) repair: NO    Presentation: Cephalic    Fetal Description: walker    Fetal Position: Occiput Anterior    Birth Weight: pending    Birth Length: pending    Apgar - One Minute: 8    Apgar - Five Minutes: 9    Umbilical Cord: 3 vessels present    Specimens: Placenta discarded           Complications:  none           Cord Blood Results:   Information for the patient's :  Rolf Ocampo [[id number]]   No results found for: PCTABR, PCTDIG, BILI, ABORH    Prenatal Labs:     Lab Results   Component Value Date/Time    ABO/Rh(D) A POS 2010 04:15 AM    HBsAg, External Negative 2018    HIV, External Negative 2018    Rubella, External Immune 2018    RPR, External non reactive 2015    RPR, External nonreactive 2015    Gonorrhea, External Negative 2018    Chlamydia, External Negative 2018    GrBStrep, External Positive 2019      This 20 y.o.  at 40w1d weeks progressed to FD and pushed for over 2 contractions to deliver a viable female infant in direct OA position over intact perineum. Checked for nuchal cord, which was absent. The shoulders and body were delivered with the usual fashion. Infant was placed on mom's chest.  Cord was clamped x2 after a 60 second delay and cut by FOB. The placenta was then delivered spontaneously with gentle traction and it was found to be intact with a 3 vessel cord. The fundus was firm to palpation and pitocin was given. EBL:200cc Fetal APGARs were 9 and 9 at 1 and 5 minutes, respectively, weight: pending Vagina, perineum were inspected and revealed no lacerations. Jignesh Salvador MD delivering  Surgical sponges, needles, and instruments were counted by both physicians and nursing staff pre-procedure and the post-procedure counts were correct.     Attending Attestation: I was present and scrubbed for the entire procedure    Signed By:  Tarik Hart MD     April 3, 2019

## 2019-04-03 NOTE — PROGRESS NOTES
7817 Bedside and Verbal shift change report given to JAYLEN Nuñez RN (oncoming nurse) by Marjorie Serna RN (offgoing nurse). Report included the following information SBAR, Intake/Output, MAR and Recent Results. 65 Dr Isa Diggs at bedside and reviewed fhr strip. SVE 4-5cm 2609 Notified Dr Isa Diggs patient is 6cm [address]. Dr Isa Diggs notified patient 10cm and on way for delivery [address] Dr Isa Diggs at bedside for delivery 1935 Bedside and Verbal shift change report given to KERA Corona RN (oncoming nurse) by JAYLEN Nuñez RN (offgoing nurse). Report included the following information SBAR, Intake/Output, MAR and Recent Results.

## 2019-04-04 PROCEDURE — 74011250637 HC RX REV CODE- 250/637: Performed by: OBSTETRICS & GYNECOLOGY

## 2019-04-04 PROCEDURE — 65410000002 HC RM PRIVATE OB

## 2019-04-04 RX ADMIN — ACETAMINOPHEN 975 MG: 650 SOLUTION ORAL at 15:25

## 2019-04-04 RX ADMIN — IBUPROFEN 800 MG: 400 TABLET, FILM COATED ORAL at 19:48

## 2019-04-04 RX ADMIN — DOCUSATE SODIUM 100 MG: 100 CAPSULE, LIQUID FILLED ORAL at 20:38

## 2019-04-04 RX ADMIN — IBUPROFEN 800 MG: 400 TABLET, FILM COATED ORAL at 03:14

## 2019-04-04 RX ADMIN — IBUPROFEN 800 MG: 400 TABLET, FILM COATED ORAL at 11:02

## 2019-04-04 NOTE — LACTATION NOTE
This note was copied from a baby's chart. Mother is unable to tolerate latching due to pain. She asked nurse for formula. We attempted latching with shield but mother could not tolerate the pain. Formula fed to infant via syringe. Pediatrician ordered ENT consult. David Assessment for Lingual Frenulum Function Function Appearance Lateralization:  
   2: Complete 1: Body of tongue but not tongue tip 
   0: None 1  
Appearance of tongue when lifted:  
   2: Round or square 1: Slight cleft in tip apparent 
   0: Heart or V-shaped 1  
 
Lift of tongue: 
   2: Tip to mid-mouth 
   1: Only edges to mid-mouth 
   0: Tip stays at lower alveolar ridge or 
       rises to mid-mouth only with jaw   
       closure                                                                                    1  
Elasticity of frenulum: 
    2: Very elastic 
    1: Moderately elastic 
    0: Little or no elasticity 1  
 
Extension of tongue: 
   2: Tip over lower lip 1: Tip over lower gum only 0: Neither of the above, or anterior              or mid-tongue humps 1 
  
Length of lingual frenulum when tongue lifted: 
   2: > 1 cm 
   1: = 1 cm 
   0: < 1 cm 1  
 
Spread of anterior tongue: 
   2: Complete 
   1: Moderate or partial 
  0: Little or none 1  
Attachment of lingual frenulum to tongue: 
   2: Posterior to tip 
   1: At tip 
   0: Notched Tip 1 Cuppin: Entire edge, firm cup 
   1: Side edges only, moderate cup 0: Poor or no cup 1  
 
Attachment of lingual frenulum to inferior alveolar ridge: 
   2: Attached to floor of mouth or well           below ridge 1: Attached just below ridge 
   0: Attached at ridge 1  
 
Peristalsis: 
    2: Complete, anterior to posterior 1: Partial, originating posterior to tip 
   0: None or reverse motion 1   
 
Snapback: 
   2: None 1: Periodic 
   0: Frequent or with each suck 0 Score Appearance: 5 
(<8 = ankyloglossia) Function:      6 
(<11 = ankyloglossia) Significant ankyloglossia is diagnosed when the appearance score total is 8 or less and/or function score total was 11 or less. Severe maternal nipple pain during breastfeeding, without alternate explanation, (as assessed by a Lactation Consultant), is also grounds to consider frenotomy, if a tight anterior frenulum is noted. Appearance Criteria: 
 
Appearance of the tongue when lifted is determined by inspecting the anterior edge of the tongue as the infant cries or tries to lift or extend the tongue. Elasticity of the frenulum is determined by palpating the frenulum for elasticity while lifting the infants tongue. Length of the lingual frenulum is determined by noting its approximate 
length in centimeters as the tongue is lifted. Attachment of the frenulum to the tongue is determined by noting its origin on 
the inferior aspect of the tongue. It should be approximately 1 cm posterior to the tip. Attachment of the lingual frenulum to the inferior alveolar ridge is determined by noting the location of the anterior attachment of the frenulum. It should insert proximal to or into the genioglossus muscle on the floor of the mouth. Function Criteria: 
 
Lateralization is measured by eliciting the transverse tongue reflex by tracing the lower gum ridge and brushing the lateral edge of the tongue with the examiners finger. Lift of the tongue is noted when the finger is removed from the infants mouth. If the infant cries, then the tongue tip should lift to mid-mouth without jaw closure. Extension of the tongue is measured by eliciting the tongue extrusion reflex bybrushing the lower lip downward toward the chin. Spread of anterior tongue is determined by first eliciting a rooting reflex, just before cupping, by tickling the upper and lower lips and looking for even thinning of the anterior tongue. Cupping is a measure of the degree to which the tongue hugs the finger as the infant sucks on it. Peristalsis is a backward, wave-like motion of the tongue during sucking that should originate at the tip of the tongue and is felt with the back of the examiners finger. Snapback is heard as a clucking sound when the tethered tongue loses it grasp on the finger or breast when the infant tries to generate negative pressure. KIKI Chu, Afshan Monge. (2002). Ankyloglossia: Assessment, Incidence, and 
Effect of Frenuloplasty on the Breastfeeding Dyad.  Pediatrics 2002;110;e63

## 2019-04-04 NOTE — ROUTINE PROCESS
TRANSFER - IN REPORT: 
 
Verbal report received from NAINA Corona RN(name) on Nadege [place]  being received from L & D (unit) for routine progression of care Report consisted of patients Situation, Background, Assessment and  
Recommendations(SBAR). Information from the following report(s) SBAR, Intake/Output and MAR was reviewed with the receiving nurse. Opportunity for questions and clarification was provided. Assessment completed upon patients arrival to unit and care assumed.

## 2019-04-04 NOTE — PROGRESS NOTES
Post-Partum Day Number 1 Progress Note Patient doing well post-partum without significant complaints. Voiding without difficulty, normal lochia. Vitals:   
Patient Vitals for the past 24 hrs: 
 BP Temp Pulse Resp  
19 0317 102/66 97.9 °F (36.6 °C) 60 16  
19 2150 126/78 98.9 °F (37.2 °C) 70 16  
19 2045 128/74 99 °F (37.2 °C) 79 16  
19 2026 119/75 98.6 °F (37 °C) 82 14  
19 1904 135/60  85   
19 1849 130/65  86   
19 1834 130/68  86   
19 1819 130/60  85   
19 1805 152/70  79   
19 1749 112/61     
19 1734 134/72  82   
19 1719 134/70  86   
19 1704 128/67 98.6 °F (37 °C) 92 16  
19 1427 127/63 98.2 °F (36.8 °C) 93 16  
19 1103 121/59 99.5 °F (37.5 °C) 95 16 Temp (24hrs), Av.7 °F (37.1 °C), Min:97.9 °F (36.6 °C), Max:99.5 °F (37.5 °C) Vital signs stable, afebrile. Exam:  Patient without distress. Abdomen soft, fundus firm, nontender Lower extremities- nontender without edema; no cords Labs:  
Recent Results (from the past 24 hour(s)) CBC W/O DIFF Collection Time: 19  9:07 AM  
Result Value Ref Range WBC 7.2 3.6 - 11.0 K/uL  
 RBC 4.20 3.80 - 5.20 M/uL  
 HGB 11.1 (L) 11.5 - 16.0 g/dL HCT 34.0 (L) 35.0 - 47.0 % MCV 81.0 80.0 - 99.0 FL  
 MCH 26.4 26.0 - 34.0 PG  
 MCHC 32.6 30.0 - 36.5 g/dL  
 RDW 13.2 11.5 - 14.5 % PLATELET 979 653 - 030 K/uL MPV 9.4 8.9 - 12.9 FL  
 NRBC 0.0 0  WBC ABSOLUTE NRBC 0.00 0.00 - 0.01 K/uL Assessment and Plan:  Patient appears to be having uncomplicated post-partum course. Continue routine perineal care and maternal education. Plan discharge tomorrow if no problems occur. A+/RI/ female infant.

## 2019-04-04 NOTE — PROGRESS NOTES
1935 Bedside and Verbal shift change report given to NAINA Cornoa RN (oncoming nurse) by Rubens Luis RN (offgoing nurse). Report included the following information SBAR, Kardex, Intake/Output, MAR and Recent Results. 2040 TRANSFER - OUT REPORT: 
 
Verbal report given to DEEJAY Cornelius RN (name) on Christina Madera  being transferred to MIU(unit) for routine progression of care Report consisted of patients Situation, Background, Assessment and  
Recommendations(SBAR). Information from the following report(s) SBAR, Kardex, Intake/Output, MAR and Recent Results was reviewed with the receiving nurse. Lines:    
 
Opportunity for questions and clarification was provided. Patient transported with: 
 Registered Nurse

## 2019-04-04 NOTE — LACTATION NOTE
This note was copied from a baby's chart. Mother declined offer of breastfeeding assistance after frenotomy. Infant was crying after procedure and mother stated that she was too overwhelmed to try breastfeeding at that time. Mother encouraged to breastfeed or formula feed at that time as baby was vigorously sucking at fists showing signs of hunger. Mother agreed to call for assistance when needed.

## 2019-04-04 NOTE — ROUTINE PROCESS
0730: Bedside shift change report given to AMRIK Reddy RN (oncoming nurse) by Ynes Hernandez RN (offgoing nurse). Report included the following information SBAR.

## 2019-04-04 NOTE — ROUTINE PROCESS
Bedside and Verbal shift change report given to Lalo Florian (oncoming nurse) by Ashkan Diaz (offgoing nurse). Report included the following information SBAR. Pt chooses to give formula due to mother's choice. Educated on effects of early supplementation to breastfeeding success, hands on assistance and instruction offered. After education and interventions mother chooses to supplement with formula.

## 2019-04-05 VITALS
HEART RATE: 81 BPM | SYSTOLIC BLOOD PRESSURE: 111 MMHG | BODY MASS INDEX: 38.82 KG/M2 | TEMPERATURE: 99 F | DIASTOLIC BLOOD PRESSURE: 72 MMHG | RESPIRATION RATE: 18 BRPM | WEIGHT: 233 LBS | HEIGHT: 65 IN

## 2019-04-05 PROCEDURE — 74011250637 HC RX REV CODE- 250/637: Performed by: OBSTETRICS & GYNECOLOGY

## 2019-04-05 RX ORDER — IBUPROFEN 800 MG/1
800 TABLET ORAL EVERY 8 HOURS
Qty: 30 TAB | Refills: 1 | Status: SHIPPED | OUTPATIENT
Start: 2019-04-05 | End: 2021-08-30

## 2019-04-05 RX ADMIN — IBUPROFEN 800 MG: 400 TABLET, FILM COATED ORAL at 04:08

## 2019-04-05 RX ADMIN — ACETAMINOPHEN 975 MG: 650 SOLUTION ORAL at 01:36

## 2019-04-05 NOTE — ROUTINE PROCESS
0800- SBAR report received from [address]- Discharge papers reviewed and signed, instructions given for self care and  care and follow up. Allowed time for questions and answers.

## 2019-04-05 NOTE — DISCHARGE SUMMARY
Obstetrical Discharge Summary     Name: Gilmar Lion MRN: [id number]  SSN: [id number]    YOB: 1995  Age: 20 y.o. Sex: female      Allergies: Patient has no known allergies. Admit Date: 4/3/2019    Discharge Date: 2019     Admitting Physician: Alejandro Morrow MD     Attending Physician:  Emre Funez MD     * Admission Diagnoses: Labor without complication [I87]    * Discharge Diagnoses:   Information for the patient's :  Rosamaria Ragland [[id number]]   Delivery of a 2.625 kg female infant via Vaginal, Spontaneous on 4/3/2019 at 4:53 PM  by . Apgars were 8 and 9.        Additional Diagnoses:   Hospital Problems as of 2019 Date Reviewed: 2018          Codes Class Noted - Resolved POA    Labor without complication GDH-06-GY: E22  ICD-9-CM: 650  4/3/2019 - Present Unknown             Lab Results   Component Value Date/Time    ABO/Rh(D) A POS 2010 04:15 AM    Rubella, External Immune 2018    GrBStrep, External Positive 2019    ABO,Rh A positive 2015    ABO,Rh A pos 2015      Immunization History   Administered Date(s) Administered    Td 2004       * Procedures:       Harris  Depression Scale  I have been able to laugh and see the funny side of things: As much as I always could  I have looked forward with enjoyment to things: As much as I ever did  I have blamed myself unnecessarily when things went wrong: No, never  I have been anxious or worried for no good reason: No, not at all  I have felt scared or panicky for no very good reason: No, not at all  Things have been getting on top of me: No, I have been coping as well as ever  I have been so unhappy that I have had difficulty sleeping: No, not at all  I have felt sad or miserable: No, not at all  I have been so unhappy that I have been crying: No, never  The thought of harming myself has occurred to me: Never  Total Score: 0    * Discharge Condition: [facility] Course: Normal hospital course following the delivery. * Disposition: Home    Discharge Medications:   Current Discharge Medication List      START taking these medications    Details   ibuprofen (MOTRIN) 800 mg tablet Take 1 Tab by mouth every eight (8) hours. Qty: 30 Tab, Refills: 1         CONTINUE these medications which have NOT CHANGED    Details   PNV#16-Iron Fum & PS-FA-OM-3 35-1-200 mg cap Take 1 Tab by mouth daily. * Follow-up Care/Patient Instructions:   Activity: Activity as tolerated, nothing in vagina for 6 weeks  Diet: Regular Diet  Wound Care: None needed    Follow-up Information     Follow up With Specialties Details Why Contact Info    Adalgisa Garduno MD Family Practice   [address] [facility]  [place] [address]  [phone]      Juan Judge MD Obstetrics & Gynecology, Gynecology, Obstetrics In 6 weeks  [address] [facility] [address] [place], [place] [address] [place] [address]  [phone]             Signed By:  Anne-Marie Mayes MD     April 5, 2019

## 2019-04-05 NOTE — LACTATION NOTE
This note was copied from a baby's chart. Mom and baby scheduled for discharge today. Mom has not been putting the baby to the breast because it was too painful. Baby had a frenulectomy yesterday but mom says her nipples are too sore to put the baby to the breast. Mom has not been pumping. She says she plans to begin pumping when she gets home. We reviewed pumping and milk supply. I encouraged mom to speak with her pediatrician with any breast feeding questions or concerns. Mom says she has no further questions for lactation before discharge.

## 2019-04-05 NOTE — DISCHARGE INSTRUCTIONS
POSTPARTUM DISCHARGE INSTRUCTIONS       Name:  Naga Monroy  YOB: 1995  Admission Diagnosis:  Labor without complication [W71]     Discharge Diagnosis:    Problem List as of 4/5/2019 Date Reviewed: 6/22/2018          Codes Class Noted - Resolved    Labor without complication [id number]: K51  ICD-9-CM: 650  4/3/2019 - Present        Obesity, morbid ([address] ICD-10-CM: E66.01  ICD-9-CM: 278.01  3/22/2018 - Present        Umbilical hernia [id number]: K42.9  ICD-9-CM: 553.1  10/8/2012 - Present        RESOLVED: Active labor ICD-10-CM: Jarekhunter Javed  ICD-9-CM: Jarek Pollys  12/16/2015 - 3/22/2018        RESOLVED: Pelvic pressure in pregnancy, antepartum ICD-10-CM: O26.899, R10.2  ICD-9-CM: 646.83, 625.9  12/8/2015 - 3/22/2018            Attending Physician:  Dorene Maradiaga MD    Delivery Type:  Vaginal Childbirth: What To Expect At Home    Your Recovery: Your body will slowly heal in the next few weeks. It is easy to get too tired and overwhelmed during the first weeks after your baby is born. Changes in your hormones can shift your mood without warning. You may find it hard to meet the extra demands on your energy and time. Take it easy on yourself. Follow-up care is a key part of your treatment and safety. Be sure to make and go to all appointments, and call your doctor if you are having problems. It's also a good idea to know your test results and keep a list of the medicines you take. How can you care for yourself at home? Vaginal bleeding and cramps  · After delivery, you will have a bloody discharge from the vagina. This will turn pink within a week and then white or yellow after about 10 days. It may last for 2 to 4 weeks or longer, until the uterus has healed. Use pads instead of tampons until you stop bleeding. · Do not worry if you pass some blood clots, as long as they are smaller than a golf ball.  If you have a tear or stitches in your vaginal area, change the pad at least every 4 hours to prevent soreness and infection. · You may have cramps for the first few days after childbirth. These are normal and occur as the uterus shrinks to normal size. Take an over-the-counter pain medicine, such as acetaminophen (Tylenol), ibuprofen (Advil, Motrin), or naproxen (Aleve), for cramps. Read and follow all instructions on the label. Do not take aspirin, because it can cause more bleeding. Do not take acetaminophen (Tylenol) and other acetaminophen containing medications (i.e. Percocet) at the same time. Breast fullness  · Your breasts may overfill (engorge) in the first few days after delivery. To help milk flow and to relieve pain, warm your breasts in the shower or by using warm, moist towels before nursing. · If you are not nursing, do not put warmth on your breasts or touch your breasts. Wear a tight bra or sports bra and use ice until the fullness goes away. This usually takes 2 to 3 days. · Put ice or a cold pack on your breast after nursing to reduce swelling and pain. Put a thin cloth between the ice and your skin. Activity  · Eat a balanced diet. Do not try to lose weight by cutting calories. Keep taking your prenatal vitamins, or take a multivitamin. · Get as much rest as you can. Try to take naps when your baby sleeps during the day. · Get some exercise every day. But do not do any heavy exercise until your doctor says it is okay. · Wait until you are healed (about 4 to 6 weeks) before you have sexual intercourse. Your doctor will tell you when it is okay to have sex. · Talk to your doctor about birth control. You can get pregnant even before your period returns. Also, you can get pregnant while you are breast-feeding. Mental Health  · Many women get the \"baby blues\" during the first few days after childbirth. You may lose sleep, feel irritable, and cry easily. You may feel happy one minute and sad the next. Hormone changes are one cause of these emotional changes.  Also, the demands of a new baby, along with visits from relatives or other family needs, add to a mother's stress. The \"baby blues\" often peak around the fourth day. Then they ease up in less than 2 weeks. · If your moodiness or anxiety lasts for more than 2 weeks, or if you feel like life is not worth living, you may have postpartum depression. This is different for each mother. Some mothers with serious depression may worry intensely about their infant's well-being. Others may feel distant from their child. Some mothers might even feel that they might harm their baby. A mother may have signs of paranoia, wondering if someone is watching her. · With all the changes in your life, you may not know if you are depressed. Pregnancy sometimes causes changes in how you feel that are similar to the symptoms of depression. · Symptoms of depression include:  · Feeling sad or hopeless and losing interest in daily activities. These are the most common symptoms of depression. · Sleeping too much or not enough. · Feeling tired. You may feel as if you have no energy. · Eating too much or too little. · POSTPARTUM SUPPORT INTERNATIONAL (PSI) offers a Warm line; Chat with the Expert phone sessions; Information and Articles about Pregnancy and Postpartum Mood Disorders; Comprehensive List of Free Support Groups; Knowledgeable local coordinators who will offer support, information, and resources; Guide to Resources on [employer]; Calendar of events in the  mood disorders community; Latest News and Research; and [facility] [address] for [employer]. Remember - You are not alone; You are not to blame; With help, you will be well. 6-615-587-PPD(1308). WWW. POSTPARTUM. NET   · Writing or talking about death, such as writing suicide notes or talking about guns, knives, or pills. Keep the numbers for these national suicide hotlines: 4-958-117-TALK (1-771.452.6609) and 0-283-SILWFAG (0-908.278.7899).  If you or someone you know talks about suicide or feeling hopeless, get help right away. Constipation and Hemorrhoids  · Drink plenty of fluids, enough so that your urine is light yellow or clear like water. If you have kidney, heart, or liver disease and have to limit fluids, talk with your doctor before you increase the amount of fluids you drink. · Eat plenty of fiber each day. Have a bran muffin or bran cereal for breakfast, and try eating a piece of fruit for a mid-afternoon snack. · For painful, itchy hemorrhoids, put ice or a cold pack on the area several times a day for 10 minutes at a time. Follow this by putting a warm compress on the area for another 10 to 20 minutes or by sitting in a shallow, warm bath. When should you call for help? Call 911 anytime you think you may need emergency care. For example, call if:  · You are thinking of hurting yourself, your baby, or anyone else. · You passed out (lost consciousness). · You have symptoms of a blood clot in your lung (called a pulmonary embolism). These may include:    · Sudden chest pain. · Trouble breathing. · Coughing up blood. Call your doctor now or seek immediate medical care if:  · You have severe vaginal bleeding. · You are soaking through a pad each hour for 2 or more hours. · Your vaginal bleeding seems to be getting heavier or is still bright red 4 days after delivery. · You are dizzy or lightheaded, or you feel like you may faint. · You are vomiting or cannot keep fluids down. · You have a fever. · You have new or more belly pain. · You pass tissue (not just blood). · Your vaginal discharge smells bad. · Your belly feels tender or full and hard. · Your breasts are continuously painful or red. · You feel sad, anxious, or hopeless for more than a few days. · You have sudden, severe pain in your belly.   · You have symptoms of a blood clot in your leg (called a deep vein thrombosis),          such as:  · Pain in your calf, back of the knee, thigh, or groin. · Redness and swelling in your leg or groin. · You have symptoms of preeclampsia, such as:  · Sudden swelling of your face, hands, or feet. · New vision problems (such as dimness or blurring). · A severe headache. · Your blood pressure is higher than it should be or rises suddenly. · You have new nausea or vomiting. Watch closely for changes in your health, and be sure to contact your doctor if you have any problems. Additional Information:  Postpartum Support    PARENTS:  Are you feeling sad or depressed? Is it difficult for you to enjoy yourself? Do you feel more irritable or tense? Do you feel anxious or panicky? Are you having difficulty bonding with your baby? Do you feel as if you are \"out of control\" or \"going crazy\"? Are you worried that you might hurt your baby or yourself? FAMILIES: Do you worry that something is wrong but don't know how to help? Do you think that your partner or spouse is having problems coping? Are you worried that it may never get better? While many women experience some mild mood change or \"the blues\" during or after the birth of a child, 1 in 9 women experience more significant symptoms of depression or anxiety. 1 in 10 Dads become depressed during the first year. Things you can do  Being a good parent includes taking care of yourself. If you take care of yourself, you will be able to take better care of your baby and your family. · Talk to a counselor or healthcare provider who has training in  mood and anxiety problems. · Learn as much as you can about pregnancy and postpartum depression and anxiety. · Get support from family and friends. Ask for help when you need it. · Join a support group in your area or online. · Keep active by walking, stretching or whatever form of exercise helps you to feel better. · Get enough rest and time for yourself. · Eat a healthy diet. · Don't give up!  It may take more than one try to get the right help you need. These are general instructions for a healthy lifestyle:    No smoking/ No tobacco products/ Avoid exposure to second hand smoke    Surgeon General's Warning:  Quitting smoking now greatly reduces serious risk to your health. Obesity, smoking, and sedentary lifestyle greatly increases your risk for illness    A healthy diet, regular physical exercise & weight monitoring are important for maintaining a healthy lifestyle    Recognize signs and symptoms of STROKE:    F-face looks uneven    A-arms unable to move or move unevenly    S-speech slurred or non-existent    T-time-call 911 as soon as signs and symptoms begin - DO NOT go       back to bed or wait to see if you get better - TIME IS BRAIN. I have had the opportunity to make my options or choices for discharge. I have received and understand these instructions.

## 2019-04-05 NOTE — ROUTINE PROCESS
Bedside shift change report given to ARUNA Lopez RN (oncoming nurse) by Renny Shane RN (offgoing nurse). Report included the following information SBAR, Kardex, Intake/Output, MAR and Recent Results.

## 2019-04-05 NOTE — PROGRESS NOTES
Post-Partum Day Number 2 Progress Note Patient doing well post-partum without significant complaints. Voiding without difficulty, normal lochia. Vitals:   
Patient Vitals for the past 24 hrs: 
 BP Temp Pulse Resp  
19 0140 135/62 98.1 °F (36.7 °C) 61 16  
19 2039 125/69 98 °F (36.7 °C) 74 17  
19 1523 100/61 97.8 °F (36.6 °C) 60 18  
19 0922 115/62 97.8 °F (36.6 °C) 61 16 Temp (24hrs), Av.9 °F (36.6 °C), Min:97.8 °F (36.6 °C), Max:98.1 °F (36.7 °C) Vital signs stable, afebrile. Exam:  Patient without distress. Abdomen soft, fundus firm, nontender Lower extremities- nontender without edema; no cords Labs:  
No results found for this or any previous visit (from the past 24 hour(s)). Assessment and Plan:  Patient appears to be having uncomplicated post-partum course. Continue routine perineal care and maternal education. Plan discharge today. A+/RI/ female infant.            
 
Xi Brown MD  
2019 
7:44 AM

## 2019-04-08 ENCOUNTER — HOSPITAL ENCOUNTER (EMERGENCY)
Age: 24
Discharge: HOME | End: 2019-04-08
Attending: EMERGENCY MEDICINE
Payer: MEDICAID

## 2019-04-08 VITALS
OXYGEN SATURATION: 100 % | TEMPERATURE: 98.8 F | HEART RATE: 82 BPM | RESPIRATION RATE: 16 BRPM | DIASTOLIC BLOOD PRESSURE: 69 MMHG | SYSTOLIC BLOOD PRESSURE: 113 MMHG

## 2019-04-08 DIAGNOSIS — N39.0 URINARY TRACT INFECTION WITH HEMATURIA, SITE UNSPECIFIED: ICD-10-CM

## 2019-04-08 DIAGNOSIS — R50.9 FEVER IN ADULT: Primary | ICD-10-CM

## 2019-04-08 DIAGNOSIS — R31.9 URINARY TRACT INFECTION WITH HEMATURIA, SITE UNSPECIFIED: ICD-10-CM

## 2019-04-08 LAB
ALBUMIN SERPL-MCNC: 2.9 G/DL (ref 3.5–5)
ALBUMIN/GLOB SERPL: 0.7 {RATIO} (ref 1.1–2.2)
ALP SERPL-CCNC: 119 U/L (ref 45–117)
ALT SERPL-CCNC: 57 U/L (ref 12–78)
ANION GAP SERPL CALC-SCNC: 8 MMOL/L (ref 5–15)
APPEARANCE UR: ABNORMAL
AST SERPL-CCNC: 41 U/L (ref 15–37)
BACTERIA URNS QL MICRO: ABNORMAL /HPF
BASOPHILS # BLD: 0 K/UL (ref 0–0.1)
BASOPHILS NFR BLD: 0 % (ref 0–1)
BILIRUB SERPL-MCNC: 0.3 MG/DL (ref 0.2–1)
BILIRUB UR QL: NEGATIVE
BUN SERPL-MCNC: 5 MG/DL (ref 6–20)
BUN/CREAT SERPL: 9 (ref 12–20)
CALCIUM SERPL-MCNC: 9.1 MG/DL (ref 8.5–10.1)
CHLORIDE SERPL-SCNC: 103 MMOL/L (ref 97–108)
CO2 SERPL-SCNC: 23 MMOL/L (ref 21–32)
COLOR UR: ABNORMAL
COMMENT, HOLDF: NORMAL
CREAT SERPL-MCNC: 0.56 MG/DL (ref 0.55–1.02)
DIFFERENTIAL METHOD BLD: ABNORMAL
EOSINOPHIL # BLD: 0 K/UL (ref 0–0.4)
EOSINOPHIL NFR BLD: 0 % (ref 0–7)
EPITH CASTS URNS QL MICRO: ABNORMAL /LPF
ERYTHROCYTE [DISTWIDTH] IN BLOOD BY AUTOMATED COUNT: 13.2 % (ref 11.5–14.5)
FLUAV AG NPH QL IA: NEGATIVE
FLUBV AG NOSE QL IA: NEGATIVE
GLOBULIN SER CALC-MCNC: 4 G/DL (ref 2–4)
GLUCOSE SERPL-MCNC: 81 MG/DL (ref 65–100)
GLUCOSE UR STRIP.AUTO-MCNC: NEGATIVE MG/DL
HCT VFR BLD AUTO: 35.2 % (ref 35–47)
HGB BLD-MCNC: 11.6 G/DL (ref 11.5–16)
HGB UR QL STRIP: ABNORMAL
IMM GRANULOCYTES # BLD AUTO: 0.1 K/UL (ref 0–0.04)
IMM GRANULOCYTES NFR BLD AUTO: 2 % (ref 0–0.5)
KETONES UR QL STRIP.AUTO: NEGATIVE MG/DL
LEUKOCYTE ESTERASE UR QL STRIP.AUTO: ABNORMAL
LYMPHOCYTES # BLD: 1.3 K/UL (ref 0.8–3.5)
LYMPHOCYTES NFR BLD: 22 % (ref 12–49)
MCH RBC QN AUTO: 26.8 PG (ref 26–34)
MCHC RBC AUTO-ENTMCNC: 33 G/DL (ref 30–36.5)
MCV RBC AUTO: 81.3 FL (ref 80–99)
MONOCYTES # BLD: 0.5 K/UL (ref 0–1)
MONOCYTES NFR BLD: 8 % (ref 5–13)
NEUTS SEG # BLD: 4.2 K/UL (ref 1.8–8)
NEUTS SEG NFR BLD: 68 % (ref 32–75)
NITRITE UR QL STRIP.AUTO: NEGATIVE
NRBC # BLD: 0 K/UL (ref 0–0.01)
NRBC BLD-RTO: 0 PER 100 WBC
PH UR STRIP: 7 [PH] (ref 5–8)
PLATELET # BLD AUTO: 247 K/UL (ref 150–400)
PMV BLD AUTO: 9.4 FL (ref 8.9–12.9)
POTASSIUM SERPL-SCNC: 4 MMOL/L (ref 3.5–5.1)
PROT SERPL-MCNC: 6.9 G/DL (ref 6.4–8.2)
PROT UR STRIP-MCNC: ABNORMAL MG/DL
RBC # BLD AUTO: 4.33 M/UL (ref 3.8–5.2)
RBC #/AREA URNS HPF: ABNORMAL /HPF (ref 0–5)
SAMPLES BEING HELD,HOLD: NORMAL
SODIUM SERPL-SCNC: 134 MMOL/L (ref 136–145)
SP GR UR REFRACTOMETRY: 1.01 (ref 1–1.03)
UR CULT HOLD, URHOLD: NORMAL
UR CULT HOLD, URHOLD: NORMAL
UROBILINOGEN UR QL STRIP.AUTO: 0.2 EU/DL (ref 0.2–1)
WBC # BLD AUTO: 6.1 K/UL (ref 3.6–11)
WBC URNS QL MICRO: >100 /HPF (ref 0–4)

## 2019-04-08 PROCEDURE — 74011250637 HC RX REV CODE- 250/637: Performed by: PHYSICIAN ASSISTANT

## 2019-04-08 PROCEDURE — 74011250637 HC RX REV CODE- 250/637: Performed by: EMERGENCY MEDICINE

## 2019-04-08 PROCEDURE — 87804 INFLUENZA ASSAY W/OPTIC: CPT

## 2019-04-08 PROCEDURE — 87147 CULTURE TYPE IMMUNOLOGIC: CPT

## 2019-04-08 PROCEDURE — 36415 COLL VENOUS BLD VENIPUNCTURE: CPT

## 2019-04-08 PROCEDURE — 80053 COMPREHEN METABOLIC PANEL: CPT

## 2019-04-08 PROCEDURE — 99283 EMERGENCY DEPT VISIT LOW MDM: CPT

## 2019-04-08 PROCEDURE — 85025 COMPLETE CBC W/AUTO DIFF WBC: CPT

## 2019-04-08 PROCEDURE — 87086 URINE CULTURE/COLONY COUNT: CPT

## 2019-04-08 PROCEDURE — 81001 URINALYSIS AUTO W/SCOPE: CPT

## 2019-04-08 RX ORDER — CEPHALEXIN 500 MG/1
500 CAPSULE ORAL
Status: COMPLETED | OUTPATIENT
Start: 2019-04-08 | End: 2019-04-08

## 2019-04-08 RX ORDER — ACETAMINOPHEN 500 MG
1000 TABLET ORAL
Status: COMPLETED | OUTPATIENT
Start: 2019-04-08 | End: 2019-04-08

## 2019-04-08 RX ORDER — CEPHALEXIN 500 MG/1
500 CAPSULE ORAL 2 TIMES DAILY
Qty: 14 CAP | Refills: 0 | Status: SHIPPED | OUTPATIENT
Start: 2019-04-08 | End: 2019-04-15

## 2019-04-08 RX ADMIN — ACETAMINOPHEN 1000 MG: 500 TABLET ORAL at 18:56

## 2019-04-08 RX ADMIN — CEPHALEXIN 500 MG: 500 CAPSULE ORAL at 20:19

## 2019-04-08 NOTE — ED PROVIDER NOTES
Patient had spontaneous vaginal delivery admission, discharged 3 days ago. Has had some body aches and developed fever today. The history is provided by the patient. Fever This is a new problem. The current episode started 6 to 12 hours ago. The problem occurs constantly. The problem has not changed since onset. The maximum temperature noted was 102 - 102.9 F. The temperature was taken using an oral thermometer. Associated symptoms include congestion (prior to vaginal delivery admission). Pertinent negatives include no chest pain, no headaches, no sore throat, no cough, no shortness of breath, no neck pain and no urinary symptoms. She has tried nothing for the symptoms. Past Medical History:  
Diagnosis Date  Chlamydia   
 hx of a few years ago and treated  Headache(784.0)  Heart abnormalities murmur  Heart murmur  Musculoskeletal disorder  Stool color black  Vaginal delivery Past Surgical History:  
Procedure Laterality Date  HX HEENT [place] removed in   HX OTHER SURGICAL  D&C   
  x3 Family History:  
Problem Relation Age of Onset  Stroke Maternal Grandfather  Hypertension Maternal Grandfather  Heart Attack Maternal Uncle Social History Socioeconomic History  Marital status: SINGLE Spouse name: Not on file  Number of children: Not on file  Years of education: Not on file  Highest education level: Not on file Occupational History  Not on file Social Needs  Financial resource strain: Not on file  Food insecurity:  
  Worry: Not on file Inability: Not on file  Transportation needs:  
  Medical: Not on file Non-medical: Not on file Tobacco Use  Smoking status: Current Every Day Smoker Packs/day: 0.25  Smokeless tobacco: Never Used Substance and Sexual Activity  Alcohol use: No  
  Comment: rarely  Drug use: No  
 Sexual activity: Yes  
  Partners: Male Birth control/protection: None Lifestyle  Physical activity:  
  Days per week: Not on file Minutes per session: Not on file  Stress: Not on file Relationships  Social connections:  
  Talks on phone: Not on file Gets together: Not on file Attends [employer] service: Not on file Active member of club or organization: Not on file Attends meetings of clubs or organizations: Not on file Relationship status: Not on file  Intimate partner violence:  
  Fear of current or ex partner: Not on file Emotionally abused: Not on file Physically abused: Not on file Forced sexual activity: Not on file Other Topics Concern [address] Service Not Asked  Blood Transfusions Not Asked  Caffeine Concern Not Asked  Occupational Exposure Not Asked Tres Coop Hazards Not Asked  Sleep Concern Not Asked  Stress Concern Not Asked  Weight Concern Not Asked  Special Diet Not Asked  Back Care Not Asked  Exercise Not Asked  Bike Helmet Not Asked  Seat Belt Not Asked  Self-Exams Not Asked Social History Narrative  Not on file ALLERGIES: Patient has no known allergies. Review of Systems Constitutional: Positive for fever. HENT: Positive for congestion (prior to vaginal delivery admission). Negative for sore throat. Respiratory: Negative for cough and shortness of breath. Cardiovascular: Negative for chest pain. Musculoskeletal: Positive for myalgias. Negative for neck pain. Neurological: Negative for headaches. All other systems reviewed and are negative. Vitals:  
 04/08/19 1716 04/08/19 1815 BP:  104/68 Pulse: 88 89 Resp: 16 16 Temp:  (!) 101.1 °F (38.4 °C) SpO2: 100% 100% Physical Exam  
Constitutional: She appears well-developed and well-nourished. No distress. HENT:  
Head: Normocephalic and atraumatic. Eyes: Conjunctivae are normal.  
Neck: Neck supple. Cardiovascular: Normal rate, regular rhythm and normal heart sounds. Pulmonary/Chest: Effort normal and breath sounds normal. No stridor. No respiratory distress. She has no wheezes. She has no rales. Abdominal: Soft. She exhibits no distension. There is no tenderness. There is no rebound and no guarding. Musculoskeletal: Normal range of motion. She exhibits no deformity. Neurological: She is alert. No cranial nerve deficit. Skin: Skin is warm and dry. Psychiatric: Her behavior is normal.  
Nursing note and vitals reviewed. MDM 
  
20 y.o. female presents with post-partum fever and body aches. Had uncomplicated term . Febrile on arrival with nonfocal symptoms but well appearing. She tells me she had some congestion prior to pregnancy but fever was first noted today. Will screen labs and urine for post-delivery infection sources. Doubt retained POC or endometritis with lack of abdominal pain or discharge. UTI noted on UA. Not septic. Will treat empirically with keflex pending culture. Plan to follow up with PCP as needed and return precautions discussed for worsening or new concerning symptoms. Procedures

## 2019-04-08 NOTE — ED TRIAGE NOTES
Pt reports that she is 5 days post-partum. C/o fever, chills, body aches, headache, sore throat x 5 days. Taking Tylenol.

## 2019-04-09 NOTE — ED NOTES
8:00 PM 
Change of shift. Care of patient taken over from Dr. Yovany Magallanes; H&P reviewed, bedside handoff complete. Awaiting CMP. Patient is one week postpartum with uncomplicated delivery. Noted to have fever today. She is well appearing with a positive UA. Will plan to discharge with Keflex if no acute findings on CMP. PROGRESS NOTE: 
8:27 PM 
CMP has no acute findings. Will discharge with Keflex as decided by Dr. Yovany Magallanes. 8:28 PM 
Patient's results have been reviewed with them. Patient and/or family have verbally conveyed their understanding and agreement of the patient's signs, symptoms, diagnosis, treatment and prognosis and additionally agree to follow up as recommended or return to the Emergency Room should their condition change prior to follow-up. Discharge instructions have also been provided to the patient with some educational information regarding their diagnosis as well a list of reasons why they would want to return to the ER prior to their follow-up appointment should their condition change.

## 2019-04-10 LAB
BACTERIA SPEC CULT: ABNORMAL
CC UR VC: ABNORMAL
SERVICE CMNT-IMP: ABNORMAL

## 2019-04-22 ENCOUNTER — OFFICE VISIT (OUTPATIENT)
Dept: PRIMARY CARE CLINIC | Age: 24
End: 2019-04-22

## 2019-04-22 VITALS
WEIGHT: 220 LBS | HEIGHT: 65 IN | HEART RATE: 82 BPM | BODY MASS INDEX: 36.65 KG/M2 | DIASTOLIC BLOOD PRESSURE: 84 MMHG | RESPIRATION RATE: 16 BRPM | SYSTOLIC BLOOD PRESSURE: 119 MMHG | TEMPERATURE: 99.1 F | OXYGEN SATURATION: 98 %

## 2019-04-22 DIAGNOSIS — K42.9 UMBILICAL HERNIA WITHOUT OBSTRUCTION AND WITHOUT GANGRENE: Primary | ICD-10-CM

## 2019-04-22 RX ORDER — LANOLIN ALCOHOL/MO/W.PET/CERES
CREAM (GRAM) TOPICAL
COMMUNITY
End: 2021-08-30

## 2019-04-22 NOTE — PROGRESS NOTES
Chief Complaint Patient presents with  Umbilical Hernia  
  patient reports it is getting bigger and bigger and would like to see someone  Referral Request  
  GI- umbilical hernia removal  
 
1. Have you been to the ER, urgent care clinic since your last visit? Hospitalized since your last visit? Yes [facility]- UTI s/s 4/8/19 
 
2. Have you seen or consulted any other health care providers outside of the [address] [place] [address] since your last visit? Include any pap smears or colon screening.  No

## 2019-04-22 NOTE — PROGRESS NOTES
Subjective: Chief Complaint Patient presents with  Umbilical Hernia  
  patient reports it is getting bigger and bigger and would like to see someone  Referral Request  
  GI- umbilical hernia removal  
  
 
She  is a 20y.o. year old female and 2 weeks s/p normal vaginal delivery is here with a concern about umbilical hernia getting bigger. Reports that it used to be small in size but after this third pregnancy ist keep getting bigger. She reports that its sensitive when she touch the area otherwise she is not having any pain . Denies any pain with movement, coughing, laughing. On 4/8/2019 she went to ER with a c/o fever and diagnosed with UTI. She reports that she took antibiotic and now she is fever free. Temp noted to in office today is 99.1, patient is asymptomatic. She is breast feeding, denies any abnormal vaginal discharge, breast pain. Denies any chills fever. Pertinent items are noted in HPI. Objective:  
 
Vitals:  
 04/22/19 9714 BP: 119/84 Pulse: 82 Resp: 16 Temp: 99.1 °F (37.3 °C) TempSrc: Oral  
SpO2: 98% Weight: 220 lb (99.8 kg) Height: 5' 5\" (1.651 m) Physical Examination: General appearance - alert, well appearing, and in no distress, oriented to person, place, and time and overweight Mental status - alert, oriented to person, place, and time, normal mood, behavior, speech, dress, motor activity, and thought processes Neck - supple, no significant adenopathy Chest - clear to auscultation, no wheezes, rales or rhonchi, symmetric air entry Heart - normal rate, regular rhythm, normal S1, S2, no murmurs, rubs, clicks or gallops Abdomen - HERNIA EXAM: umbilical hernia, reducible, nontender No Known Allergies Social History Socioeconomic History  Marital status: SINGLE Spouse name: Not on file  Number of children: Not on file  Years of education: Not on file  Highest education level: Not on file Tobacco Use  
  Smoking status: Former Smoker Packs/day: 0.25  Smokeless tobacco: Never Used Substance and Sexual Activity  Alcohol use: No  
  Comment: rarely  Drug use: No  
 Sexual activity: Yes  
  Partners: Male Birth control/protection: None Other Topics Concern Family History Problem Relation Age of Onset  Stroke Maternal Grandfather  Hypertension Maternal Grandfather  Heart Attack Maternal Uncle Past Surgical History:  
Procedure Laterality Date  HX HEENT [place] removed in 2011  HX OTHER SURGICAL  D&C   
  x3 Past Medical History:  
Diagnosis Date  Chlamydia   
 hx of a few years ago and treated  Headache(784.0)  Heart abnormalities murmur  Heart murmur  Musculoskeletal disorder  Stool color black  Vaginal delivery Current Outpatient Medications Medication Sig Dispense Refill  multivit,calc,mins/iron/folic (ONE-A-DAY WOMENS FORMULA PO) Take  by mouth.  ferrous sulfate (IRON) 325 mg (65 mg iron) tablet Take  by mouth Daily (before breakfast).  ibuprofen (MOTRIN) 800 mg tablet Take 1 Tab by mouth every eight (8) hours. 30 Tab 1 Assessment/ Plan:  
Diagnoses and all orders for this visit: 
 
1. Umbilical hernia without obstruction and without gangrene 
-     REFERRAL TO GENERAL SURGERY Discussed about the low grade temp noticed in office today. Patient is asymptomatic. Discussed warning symptoms and signs and follow up in needed. Medication risks/benefits/costs/interactions/alternatives discussed with patient. Advised patient to call back or return to office if symptoms worsen/change/persist. If patient cannot reach us or should anything more severe/urgent arise he/she should proceed directly to the nearest emergency department. Discussed expected course/resolution/complications of diagnosis in detail with patient.  
Patient given a written after visit summary which includes her diagnoses, current medications and vitals. Patient expressed understanding with the diagnosis and plan. Follow-up and Dispositions · Return in about 1 month (around 5/22/2019) for complete physical  and fasting blood work. Naty Zapien

## 2019-06-26 ENCOUNTER — OFFICE VISIT (OUTPATIENT)
Dept: SURGERY | Age: 24
End: 2019-06-26

## 2019-06-26 VITALS
BODY MASS INDEX: 37.65 KG/M2 | DIASTOLIC BLOOD PRESSURE: 80 MMHG | SYSTOLIC BLOOD PRESSURE: 134 MMHG | OXYGEN SATURATION: 98 % | TEMPERATURE: 98.2 F | WEIGHT: 226 LBS | HEART RATE: 87 BPM | RESPIRATION RATE: 16 BRPM | HEIGHT: 65 IN

## 2019-06-26 DIAGNOSIS — K42.9 UMBILICAL HERNIA WITHOUT OBSTRUCTION AND WITHOUT GANGRENE: Primary | ICD-10-CM

## 2019-06-26 NOTE — PROGRESS NOTES
1. Have you been to the ER, urgent care clinic since your last visit? Hospitalized since your last visit? No    2. Have you seen or consulted any other health care providers outside of the [address] [place] [address] since your last visit? Include any pap smears or colon screening.  No

## 2019-06-26 NOTE — LETTER
6/26/19 Patient: Jazlyn Julien YOB: 1995 Date of Visit: 6/26/2019 Rio Sampson MD 
[address] [place] [id number] VIA In Basket Dear Rio Sampson MD, Thank you for referring Ms. Jazlyn Julien to [facility] for evaluation. My notes for this consultation are attached. If you have questions, please do not hesitate to call me. I look forward to following your patient along with you. Sincerely, Xander Wei MD

## 2019-06-26 NOTE — PROGRESS NOTES
Subjective:      Guilherme Coyle  is a 20 y. o.  female who was referred by Dr. Wylie Mcburney, who presents for evaluation of umbilical hernia. Pt points to bulge in umbilicus and reports intermittent \"pulling/tugging\" sensation. Pt notes that this has enlarged with each pregnancy. Pt notes she is considering having more children. Past Medical History:   Diagnosis Date    Chlamydia     hx of a few years ago and treated    Headache(784.0)     Heart abnormalities murmur    Heart murmur     Musculoskeletal disorder     Stool color black     Vaginal delivery        Past Surgical History:   Procedure Laterality Date    HX HEENT      [place] removed in     HX OTHER SURGICAL  D&C      x3       Social History     Tobacco Use    Smoking status: Current Every Day Smoker     Packs/day: 0.25    Smokeless tobacco: Never Used   Substance Use Topics    Alcohol use: Yes     Comment: rarely       Family History   Problem Relation Age of Onset    Stroke Maternal Grandfather     Hypertension Maternal Grandfather     Heart Attack Maternal Uncle        Current Outpatient Medications on File Prior to Visit   Medication Sig Dispense Refill    multivit,calc,mins/iron/folic (ONE-A-DAY WOMENS FORMULA PO) Take  by mouth.  ferrous sulfate (IRON) 325 mg (65 mg iron) tablet Take  by mouth Daily (before breakfast).  ibuprofen (MOTRIN) 800 mg tablet Take 1 Tab by mouth every eight (8) hours. 30 Tab 1     No current facility-administered medications on file prior to visit. No Known Allergies      Review of Systems:    Pertinent items are noted in the History of Present Illness.     Objective:     Visit Vitals  /80 (BP 1 Location: Right arm, BP Patient Position: Sitting)   Pulse 87   Temp 98.2 °F (36.8 °C) (Oral)   Resp 16   Ht 5' 5\" (1.651 m)   Wt 226 lb (102.5 kg)   SpO2 98%   BMI 37.61 kg/m²        Physical Exam:  GENERAL: alert, cooperative, no distress, appears stated age  ABDOMEN: 1.5 cm defect in the umbilicus. Non-tender. Easily reducible. Labs: No results found for this or any previous visit (from the past 24 hour(s)). Assessment and Plan:       ICD-10-CM ICD-9-CM    1. Umbilical hernia without obstruction and without gangrene K42.9 553.1        Pt has an umbilical hernia. I discussed their diagnosis thoroughly. I noted that the presence of a hernia is not a determining factor when considering surgical repair. Due to the natural progression of a hernia, this will not heal on its own and may continue to increase in size over time. I favor observation unless or until it becomes symptomatic or increases to a bothersome size. Discussed details of open repair as an outpatient if pt opts to have this repaired. She is going to consider her options and will call for further planning. All questions were answered. They agree with this plan. This document was scribed by Dixie Zhang as dictated by Dr. Raquel Arcos.      Signed By: Carol Elizabeth MD     06/26/19

## 2019-11-14 ENCOUNTER — OFFICE VISIT (OUTPATIENT)
Dept: PRIMARY CARE CLINIC | Age: 24
End: 2019-11-14

## 2019-11-14 VITALS
WEIGHT: 238 LBS | HEART RATE: 78 BPM | SYSTOLIC BLOOD PRESSURE: 134 MMHG | BODY MASS INDEX: 39.65 KG/M2 | HEIGHT: 65 IN | DIASTOLIC BLOOD PRESSURE: 84 MMHG | RESPIRATION RATE: 18 BRPM | OXYGEN SATURATION: 98 % | TEMPERATURE: 98.3 F

## 2019-11-14 DIAGNOSIS — R09.81 NASAL CONGESTION: ICD-10-CM

## 2019-11-14 DIAGNOSIS — J02.9 SORE THROAT: Primary | ICD-10-CM

## 2019-11-14 DIAGNOSIS — R12 HEART BURN: ICD-10-CM

## 2019-11-14 LAB
S PYO AG THROAT QL: NEGATIVE
VALID INTERNAL CONTROL?: YES

## 2019-11-14 RX ORDER — FAMOTIDINE 20 MG/1
20 TABLET, FILM COATED ORAL
Qty: 30 TAB | Refills: 0 | Status: SHIPPED | OUTPATIENT
Start: 2019-11-14 | End: 2021-08-30

## 2019-11-14 RX ORDER — FLUTICASONE PROPIONATE 50 MCG
2 SPRAY, SUSPENSION (ML) NASAL DAILY
Qty: 1 BOTTLE | Refills: 0 | Status: SHIPPED | OUTPATIENT
Start: 2019-11-14 | End: 2019-12-16 | Stop reason: SDUPTHER

## 2019-11-14 NOTE — PROGRESS NOTES
Subjective:     Chief Complaint   Patient presents with    Sore Throat     x 2 wks    Heartburn    Chest Congestion        She  is a 30y.o. year old female who presents today with a c/o sore throat X 2 weeks. Both ear and jaw line hurts . Reports that she feels a pulling pain in her throat sometimes, not always. sometimes painful to swallow. Denies any chills fever. Reports that from yesterday she has started having nasal congestion and post nasal drip Her kids are sick with URI symptoms she states. Patient c/o epigastric burning sensation recently since she stopped doing exercise. Reports that bread , thickened texture food usually triggers the symptoms. Denies any N/V. Pertinent items are noted in HPI. Objective:     Vitals:    11/14/19 0754   BP: 134/84   Pulse: 78   Resp: 18   Temp: 98.3 °F (36.8 °C)   TempSrc: Oral   SpO2: 98%   Weight: 238 lb (108 kg)   Height: 5' 5\" (1.651 m)       Physical Examination: General appearance - alert, well appearing, and in no distress, oriented to person, place, and time and overweight  Mental status - alert, oriented to person, place, and time, normal mood, behavior, speech, dress, motor activity, and thought processes  Ears - bilateral TM's and external ear canals normal  Nose - normal nontender sinuses, mucosal congestion and mucosal erythema  Mouth - mucous membranes moist, pharynx slightly erythematous. without lesions Tonsil looks normal.   Neck - supple, no significant adenopathy  Chest - clear to auscultation, no wheezes, rales or rhonchi, symmetric air entry  Heart - normal rate, regular rhythm, normal S1, S2, no murmurs, rubs, clicks or gallops  Abdomen - soft, nontender, nondistended, no masses or organomegaly  Small umbilical hernia.      No Known Allergies   Social History     Socioeconomic History    Marital status: SINGLE     Spouse name: Not on file    Number of children: Not on file    Years of education: Not on file    Highest education level: Not on file   Tobacco Use    Smoking status: Current Every Day Smoker     Packs/day: 0.25    Smokeless tobacco: Never Used   Substance and Sexual Activity    Alcohol use: Yes     Comment: rarely    Drug use: No    Sexual activity: Yes     Partners: Male     Birth control/protection: None   Other Topics Concern      Family History   Problem Relation Age of Onset    Stroke Maternal Grandfather     Hypertension Maternal Grandfather     Heart Attack Maternal Uncle       Past Surgical History:   Procedure Laterality Date    HX HEENT      [place] removed in 2011    HX OTHER SURGICAL  D&C      x3      Past Medical History:   Diagnosis Date    Chlamydia     hx of a few years ago and treated    Headache(784.0)     Heart abnormalities murmur    Heart murmur     Musculoskeletal disorder     Stool color black     Vaginal delivery       Current Outpatient Medications   Medication Sig Dispense Refill    ferrous sulfate (IRON) 325 mg (65 mg iron) tablet Take  by mouth two (2) days a week.  multivit,calc,mins/iron/folic (ONE-A-DAY WOMENS FORMULA PO) Take  by mouth.  ibuprofen (MOTRIN) 800 mg tablet Take 1 Tab by mouth every eight (8) hours. 30 Tab 1        Assessment/ Plan:   Diagnoses and all orders for this visit:    1. Sore throat  -     AMB POC RAPID STREP A is negative. Heart burn related? Take OTC tylenol as needed. Theraflu. -     CULTURE, STREP THROAT    2. Heart burn  -     Start famotidine (PEPCID) 20 mg tablet; Take 1 Tab by mouth nightly. Avoid triggering factors. 3. Nasal congestion  -   Start   fluticasone propionate (FLONASE) 50 mcg/actuation nasal spray; 2 Sprays by Both Nostrils route daily. Medication risks/benefits/costs/interactions/alternatives discussed with patient.   Advised patient to call back or return to office if symptoms worsen/change/persist. If patient cannot reach us or should anything more severe/urgent arise he/she should proceed directly to the nearest emergency department. Discussed expected course/resolution/complications of diagnosis in detail with patient. Patient given a written after visit summary which includes her diagnoses, current medications and vitals. Patient expressed understanding with the diagnosis and plan. Follow-up and Dispositions    · Return if symptoms worsen or fail to improve.

## 2019-11-17 LAB — S PYO THROAT QL CULT: NEGATIVE

## 2019-12-16 DIAGNOSIS — R09.81 NASAL CONGESTION: ICD-10-CM

## 2019-12-16 RX ORDER — FLUTICASONE PROPIONATE 50 MCG
2 SPRAY, SUSPENSION (ML) NASAL DAILY
Qty: 1 BOTTLE | Refills: 0 | Status: SHIPPED | OUTPATIENT
Start: 2019-12-16 | End: 2021-08-30

## 2021-08-30 ENCOUNTER — OFFICE VISIT (OUTPATIENT)
Dept: ENDOCRINOLOGY | Age: 26
End: 2021-08-30
Payer: MEDICAID

## 2021-08-30 VITALS
HEIGHT: 65 IN | BODY MASS INDEX: 40.98 KG/M2 | SYSTOLIC BLOOD PRESSURE: 134 MMHG | WEIGHT: 246 LBS | HEART RATE: 106 BPM | DIASTOLIC BLOOD PRESSURE: 80 MMHG

## 2021-08-30 DIAGNOSIS — N64.3 GALACTORRHEA: Primary | ICD-10-CM

## 2021-08-30 PROCEDURE — 99204 OFFICE O/P NEW MOD 45 MIN: CPT | Performed by: INTERNAL MEDICINE

## 2021-08-30 NOTE — PROGRESS NOTES
This is a 78-year-old -American female apparently referred for evaluation and management of hyperprolactinemia/galactorrhea. Patient has been pregnant 3 times. Her most recent pregnancy was 2 years ago. She says she breast-fed for about a month. She did get milk letdown but then she stopped breast-feeding. Despite that, she has continued to have some breast discharge which she describes as milky white discharge. She denies spontaneous discharge but says on manipulation, there is some discharge. By her report, she had laboratory test done 5 months ago which reportedly showed an elevated prolactin level and she was referred for evaluation. She is now 5 months later coming to see me. She tells me that the breast discharge does seem to be decreasing in frequency and amount. She admits that there is frequent breast manipulation which may be stimulating the process. She tells me that her periods are irregular and always have been. She is currently on her period which started on . Most of her periods last 5 to 6 days. Regarding the presumed hyperprolactinemia, she denies drugs or other medications which would presumably stimulate prolactin secretion. Drinks occasionally. She denies headaches or visual changes. By her report, there is no history of hyper or hypothyroidism and there is no family history of thyroid disease. Past medical history   3 para 3 with the most recent baby weighing 5 pounds 13 ounces. She is on no medications except BCP's    Examination this is a well-developed obese -American female in no acute distress  Blood pressure 134/80  Pulse 80  Weight 246  BMI 40.9  HEENT unremarkable. Visual fields are full to confrontation  Thyroid normal  Lungs clear  Heart reveals a regular rate and rhythm  Breast examination remarkable for small amount of expressible largely clear discharge bilaterally  Abdomen obese  Extremities unremarkable    Laboratory data:  There are no laboratory data available to me    Impression  1. Breast discharge/galactorrhea likely related to continued breast manipulation after delivery 2 years ago  2. Morbid obesity with possible polycystic ovary syndrome    Plan:  1. I have ordered a prolactin level and thyroid panel and will call her with the results. I anticipate the prolactin level being minimally elevated. I have a low likelihood of prolactin secreting tumor and there do not seem to be any medications that would be stimulating prolactin secretion  2. Follow-up will be as needed.     Lab Results:    3/29/2021  Prolactin 44    8/30/2021  Prolactin 33  TSH 0.5  FT4 0.8  LH 15.7  FSH 7.8  CMP normal

## 2022-03-19 PROBLEM — E66.01 OBESITY, MORBID (HCC): Status: ACTIVE | Noted: 2018-03-22
